# Patient Record
Sex: MALE | Race: BLACK OR AFRICAN AMERICAN | NOT HISPANIC OR LATINO | Employment: OTHER | URBAN - METROPOLITAN AREA
[De-identification: names, ages, dates, MRNs, and addresses within clinical notes are randomized per-mention and may not be internally consistent; named-entity substitution may affect disease eponyms.]

---

## 2024-09-26 ENCOUNTER — HOSPITAL ENCOUNTER (INPATIENT)
Facility: HOSPITAL | Age: 64
LOS: 3 days | Discharge: HOME HEALTH | DRG: 066 | End: 2024-09-30
Attending: EMERGENCY MEDICINE | Admitting: HOSPITALIST
Payer: MEDICARE

## 2024-09-26 DIAGNOSIS — D17.0 LIPOMA OF SKIN AND SUBCUTANEOUS TISSUE OF NECK: ICD-10-CM

## 2024-09-26 DIAGNOSIS — R07.9 CHEST PAIN: ICD-10-CM

## 2024-09-26 DIAGNOSIS — I63.9 CEREBROVASCULAR ACCIDENT (CVA), UNSPECIFIED MECHANISM: ICD-10-CM

## 2024-09-26 DIAGNOSIS — R53.1 GENERALIZED WEAKNESS: ICD-10-CM

## 2024-09-26 DIAGNOSIS — R42 DIZZINESS: Primary | ICD-10-CM

## 2024-09-26 DIAGNOSIS — I16.0 HYPERTENSIVE URGENCY: ICD-10-CM

## 2024-09-26 DIAGNOSIS — E07.9 THYROID LESION: ICD-10-CM

## 2024-09-26 LAB
BILIRUBIN, POC UA: NEGATIVE
BLOOD, POC UA: NEGATIVE
CLARITY, UA: CLEAR
COLOR, UA: YELLOW
GLUCOSE, POC UA: NEGATIVE
HCT, POC: NORMAL
HGB, POC: NORMAL (ref 14–18)
KETONES, POC UA: NEGATIVE
LEUKOCYTE EST, POC UA: NEGATIVE
MCH, POC: NORMAL
MCHC, POC: NORMAL
MCV, POC: NORMAL
MPV, POC: NORMAL
NITRITE, POC UA: NEGATIVE
PH UR STRIP: 6 [PH] (ref 5–8)
POC CARDIAC TROPONIN I: 0.02 NG/ML (ref 0–0.08)
POC PLATELET COUNT: NORMAL
POCT GLUCOSE: 91 MG/DL (ref 70–110)
PROTEIN, POC UA: NEGATIVE
RBC, POC: NORMAL
RDW, POC: NORMAL
SAMPLE: NORMAL
SPECIFIC GRAVITY, POC UA: 1.02 (ref 1–1.03)
UROBILINOGEN, POC UA: 0.2 E.U./DL
WBC, POC: NORMAL

## 2024-09-26 PROCEDURE — 93010 ELECTROCARDIOGRAM REPORT: CPT | Mod: ,,, | Performed by: INTERNAL MEDICINE

## 2024-09-26 PROCEDURE — 63600175 PHARM REV CODE 636 W HCPCS: Mod: ER | Performed by: EMERGENCY MEDICINE

## 2024-09-26 PROCEDURE — G0378 HOSPITAL OBSERVATION PER HR: HCPCS | Mod: ER

## 2024-09-26 PROCEDURE — 93005 ELECTROCARDIOGRAM TRACING: CPT | Mod: ER

## 2024-09-26 PROCEDURE — 25000003 PHARM REV CODE 250: Mod: ER | Performed by: EMERGENCY MEDICINE

## 2024-09-26 RX ORDER — ASPIRIN 325 MG
325 TABLET ORAL
Status: COMPLETED | OUTPATIENT
Start: 2024-09-26 | End: 2024-09-26

## 2024-09-26 RX ORDER — AMLODIPINE BESYLATE 5 MG/1
10 TABLET ORAL
Status: COMPLETED | OUTPATIENT
Start: 2024-09-26 | End: 2024-09-26

## 2024-09-26 RX ORDER — HYDRALAZINE HYDROCHLORIDE 20 MG/ML
10 INJECTION INTRAMUSCULAR; INTRAVENOUS
Status: COMPLETED | OUTPATIENT
Start: 2024-09-26 | End: 2024-09-26

## 2024-09-26 RX ORDER — CLONIDINE HYDROCHLORIDE 0.1 MG/1
0.2 TABLET ORAL
Status: COMPLETED | OUTPATIENT
Start: 2024-09-26 | End: 2024-09-26

## 2024-09-26 RX ORDER — MECLIZINE HYDROCHLORIDE 25 MG/1
25 TABLET ORAL
Status: COMPLETED | OUTPATIENT
Start: 2024-09-26 | End: 2024-09-26

## 2024-09-26 RX ADMIN — AMLODIPINE BESYLATE 10 MG: 5 TABLET ORAL at 10:09

## 2024-09-26 RX ADMIN — CLONIDINE HYDROCHLORIDE 0.2 MG: 0.1 TABLET ORAL at 10:09

## 2024-09-26 RX ADMIN — ASPIRIN 325 MG ORAL TABLET 325 MG: 325 PILL ORAL at 08:09

## 2024-09-26 RX ADMIN — HYDRALAZINE HYDROCHLORIDE 10 MG: 20 INJECTION INTRAMUSCULAR; INTRAVENOUS at 08:09

## 2024-09-26 RX ADMIN — MECLIZINE HYDROCHLORIDE 25 MG: 25 TABLET ORAL at 08:09

## 2024-09-27 PROBLEM — I16.0 HYPERTENSIVE URGENCY: Status: ACTIVE | Noted: 2024-09-27

## 2024-09-27 PROBLEM — R42 DIZZINESS: Status: ACTIVE | Noted: 2024-09-27

## 2024-09-27 PROBLEM — R79.89 ELEVATED TSH: Status: ACTIVE | Noted: 2024-09-27

## 2024-09-27 PROBLEM — E07.9 THYROID LESION: Status: ACTIVE | Noted: 2024-09-27

## 2024-09-27 PROBLEM — R29.90 STROKE-LIKE SYMPTOMS: Status: ACTIVE | Noted: 2024-09-27

## 2024-09-27 LAB
ALBUMIN SERPL BCP-MCNC: 4.1 G/DL (ref 3.5–5.2)
ALP SERPL-CCNC: 66 U/L (ref 55–135)
ALT SERPL W/O P-5'-P-CCNC: 13 U/L (ref 10–44)
ANION GAP SERPL CALC-SCNC: 10 MMOL/L (ref 8–16)
AST SERPL-CCNC: 22 U/L (ref 10–40)
BASOPHILS # BLD AUTO: 0.04 K/UL (ref 0–0.2)
BASOPHILS NFR BLD: 0.7 % (ref 0–1.9)
BILIRUB SERPL-MCNC: 1.2 MG/DL (ref 0.1–1)
BILIRUB UR QL STRIP: NEGATIVE
BUN SERPL-MCNC: 14 MG/DL (ref 8–23)
CALCIUM SERPL-MCNC: 9.2 MG/DL (ref 8.7–10.5)
CHLORIDE SERPL-SCNC: 106 MMOL/L (ref 95–110)
CHOLEST SERPL-MCNC: 143 MG/DL (ref 120–199)
CHOLEST/HDLC SERPL: 2.2 {RATIO} (ref 2–5)
CLARITY UR: CLEAR
CO2 SERPL-SCNC: 21 MMOL/L (ref 23–29)
COLOR UR: YELLOW
CREAT SERPL-MCNC: 1.1 MG/DL (ref 0.5–1.4)
DIFFERENTIAL METHOD BLD: ABNORMAL
EOSINOPHIL # BLD AUTO: 0.1 K/UL (ref 0–0.5)
EOSINOPHIL NFR BLD: 1.9 % (ref 0–8)
ERYTHROCYTE [DISTWIDTH] IN BLOOD BY AUTOMATED COUNT: 11.8 % (ref 11.5–14.5)
EST. GFR  (NO RACE VARIABLE): >60 ML/MIN/1.73 M^2
ESTIMATED AVG GLUCOSE: 77 MG/DL (ref 68–131)
GLUCOSE SERPL-MCNC: 85 MG/DL (ref 70–110)
GLUCOSE UR QL STRIP: NEGATIVE
HBA1C MFR BLD: 4.3 % (ref 4–5.6)
HCT VFR BLD AUTO: 43.5 % (ref 40–54)
HDLC SERPL-MCNC: 64 MG/DL (ref 40–75)
HDLC SERPL: 44.8 % (ref 20–50)
HGB BLD-MCNC: 14.3 G/DL (ref 14–18)
HGB UR QL STRIP: NEGATIVE
IMM GRANULOCYTES # BLD AUTO: 0.01 K/UL (ref 0–0.04)
IMM GRANULOCYTES NFR BLD AUTO: 0.2 % (ref 0–0.5)
INR PPP: 1 (ref 0.8–1.2)
KETONES UR QL STRIP: NEGATIVE
LDLC SERPL CALC-MCNC: 66 MG/DL (ref 63–159)
LEUKOCYTE ESTERASE UR QL STRIP: NEGATIVE
LYMPHOCYTES # BLD AUTO: 2.6 K/UL (ref 1–4.8)
LYMPHOCYTES NFR BLD: 45.2 % (ref 18–48)
MAGNESIUM SERPL-MCNC: 2.2 MG/DL (ref 1.6–2.6)
MCH RBC QN AUTO: 31.1 PG (ref 27–31)
MCHC RBC AUTO-ENTMCNC: 32.9 G/DL (ref 32–36)
MCV RBC AUTO: 95 FL (ref 82–98)
MONOCYTES # BLD AUTO: 0.5 K/UL (ref 0.3–1)
MONOCYTES NFR BLD: 8.2 % (ref 4–15)
NEUTROPHILS # BLD AUTO: 2.5 K/UL (ref 1.8–7.7)
NEUTROPHILS NFR BLD: 43.8 % (ref 38–73)
NITRITE UR QL STRIP: NEGATIVE
NONHDLC SERPL-MCNC: 79 MG/DL
NRBC BLD-RTO: 0 /100 WBC
OHS QRS DURATION: 78 MS
OHS QTC CALCULATION: 453 MS
PH UR STRIP: 6 [PH] (ref 5–8)
PHOSPHATE SERPL-MCNC: 3 MG/DL (ref 2.7–4.5)
PLATELET # BLD AUTO: 223 K/UL (ref 150–450)
PMV BLD AUTO: 10.5 FL (ref 9.2–12.9)
POTASSIUM SERPL-SCNC: 3.6 MMOL/L (ref 3.5–5.1)
PROT SERPL-MCNC: 7.1 G/DL (ref 6–8.4)
PROT UR QL STRIP: ABNORMAL
PROTHROMBIN TIME: 11 SEC (ref 9–12.5)
RBC # BLD AUTO: 4.6 M/UL (ref 4.6–6.2)
SODIUM SERPL-SCNC: 137 MMOL/L (ref 136–145)
SP GR UR STRIP: 1.02 (ref 1–1.03)
T4 FREE SERPL-MCNC: 0.98 NG/DL (ref 0.71–1.51)
TRIGL SERPL-MCNC: 65 MG/DL (ref 30–150)
TSH SERPL DL<=0.005 MIU/L-ACNC: 4.91 UIU/ML (ref 0.4–4)
URN SPEC COLLECT METH UR: ABNORMAL
UROBILINOGEN UR STRIP-ACNC: NEGATIVE EU/DL
WBC # BLD AUTO: 5.71 K/UL (ref 3.9–12.7)

## 2024-09-27 PROCEDURE — 25000003 PHARM REV CODE 250: Performed by: STUDENT IN AN ORGANIZED HEALTH CARE EDUCATION/TRAINING PROGRAM

## 2024-09-27 PROCEDURE — 25000003 PHARM REV CODE 250: Performed by: NURSE PRACTITIONER

## 2024-09-27 PROCEDURE — 81003 URINALYSIS AUTO W/O SCOPE: CPT | Performed by: NURSE PRACTITIONER

## 2024-09-27 PROCEDURE — 21400001 HC TELEMETRY ROOM

## 2024-09-27 PROCEDURE — 36415 COLL VENOUS BLD VENIPUNCTURE: CPT | Performed by: STUDENT IN AN ORGANIZED HEALTH CARE EDUCATION/TRAINING PROGRAM

## 2024-09-27 PROCEDURE — 85025 COMPLETE CBC W/AUTO DIFF WBC: CPT | Performed by: STUDENT IN AN ORGANIZED HEALTH CARE EDUCATION/TRAINING PROGRAM

## 2024-09-27 PROCEDURE — 80061 LIPID PANEL: CPT | Performed by: STUDENT IN AN ORGANIZED HEALTH CARE EDUCATION/TRAINING PROGRAM

## 2024-09-27 PROCEDURE — 80053 COMPREHEN METABOLIC PANEL: CPT | Performed by: STUDENT IN AN ORGANIZED HEALTH CARE EDUCATION/TRAINING PROGRAM

## 2024-09-27 PROCEDURE — 99223 1ST HOSP IP/OBS HIGH 75: CPT | Mod: ,,, | Performed by: INTERNAL MEDICINE

## 2024-09-27 PROCEDURE — 11000001 HC ACUTE MED/SURG PRIVATE ROOM

## 2024-09-27 PROCEDURE — 84100 ASSAY OF PHOSPHORUS: CPT | Performed by: STUDENT IN AN ORGANIZED HEALTH CARE EDUCATION/TRAINING PROGRAM

## 2024-09-27 PROCEDURE — 83036 HEMOGLOBIN GLYCOSYLATED A1C: CPT | Performed by: STUDENT IN AN ORGANIZED HEALTH CARE EDUCATION/TRAINING PROGRAM

## 2024-09-27 PROCEDURE — 63600175 PHARM REV CODE 636 W HCPCS: Performed by: STUDENT IN AN ORGANIZED HEALTH CARE EDUCATION/TRAINING PROGRAM

## 2024-09-27 PROCEDURE — 25500020 PHARM REV CODE 255: Performed by: HOSPITALIST

## 2024-09-27 PROCEDURE — 83735 ASSAY OF MAGNESIUM: CPT | Performed by: STUDENT IN AN ORGANIZED HEALTH CARE EDUCATION/TRAINING PROGRAM

## 2024-09-27 PROCEDURE — 85610 PROTHROMBIN TIME: CPT | Performed by: STUDENT IN AN ORGANIZED HEALTH CARE EDUCATION/TRAINING PROGRAM

## 2024-09-27 PROCEDURE — 84439 ASSAY OF FREE THYROXINE: CPT | Performed by: STUDENT IN AN ORGANIZED HEALTH CARE EDUCATION/TRAINING PROGRAM

## 2024-09-27 PROCEDURE — 84443 ASSAY THYROID STIM HORMONE: CPT | Performed by: STUDENT IN AN ORGANIZED HEALTH CARE EDUCATION/TRAINING PROGRAM

## 2024-09-27 RX ORDER — ACETAMINOPHEN 325 MG/1
650 TABLET ORAL EVERY 4 HOURS PRN
Status: DISCONTINUED | OUTPATIENT
Start: 2024-09-27 | End: 2024-09-27

## 2024-09-27 RX ORDER — ACETAMINOPHEN 325 MG/1
650 TABLET ORAL EVERY 8 HOURS PRN
Status: DISCONTINUED | OUTPATIENT
Start: 2024-09-27 | End: 2024-09-27

## 2024-09-27 RX ORDER — ONDANSETRON HYDROCHLORIDE 2 MG/ML
4 INJECTION, SOLUTION INTRAVENOUS EVERY 8 HOURS PRN
Status: DISCONTINUED | OUTPATIENT
Start: 2024-09-27 | End: 2024-09-30 | Stop reason: HOSPADM

## 2024-09-27 RX ORDER — PROCHLORPERAZINE EDISYLATE 5 MG/ML
5 INJECTION INTRAMUSCULAR; INTRAVENOUS EVERY 6 HOURS PRN
Status: DISCONTINUED | OUTPATIENT
Start: 2024-09-27 | End: 2024-09-30 | Stop reason: HOSPADM

## 2024-09-27 RX ORDER — SODIUM CHLORIDE 0.9 % (FLUSH) 0.9 %
10 SYRINGE (ML) INJECTION
Status: DISCONTINUED | OUTPATIENT
Start: 2024-09-27 | End: 2024-09-27

## 2024-09-27 RX ORDER — SODIUM,POTASSIUM PHOSPHATES 280-250MG
2 POWDER IN PACKET (EA) ORAL
Status: DISCONTINUED | OUTPATIENT
Start: 2024-09-27 | End: 2024-09-30 | Stop reason: HOSPADM

## 2024-09-27 RX ORDER — LABETALOL HYDROCHLORIDE 5 MG/ML
10 INJECTION, SOLUTION INTRAVENOUS
Status: DISCONTINUED | OUTPATIENT
Start: 2024-09-27 | End: 2024-09-28

## 2024-09-27 RX ORDER — POLYETHYLENE GLYCOL 3350 17 G/17G
17 POWDER, FOR SOLUTION ORAL DAILY
Status: DISCONTINUED | OUTPATIENT
Start: 2024-09-27 | End: 2024-09-30 | Stop reason: HOSPADM

## 2024-09-27 RX ORDER — ATORVASTATIN CALCIUM 40 MG/1
40 TABLET, FILM COATED ORAL DAILY
Status: DISCONTINUED | OUTPATIENT
Start: 2024-09-27 | End: 2024-09-30 | Stop reason: HOSPADM

## 2024-09-27 RX ORDER — BISACODYL 10 MG/1
10 SUPPOSITORY RECTAL DAILY PRN
Status: DISCONTINUED | OUTPATIENT
Start: 2024-09-27 | End: 2024-09-30 | Stop reason: HOSPADM

## 2024-09-27 RX ORDER — LANOLIN ALCOHOL/MO/W.PET/CERES
800 CREAM (GRAM) TOPICAL
Status: DISCONTINUED | OUTPATIENT
Start: 2024-09-27 | End: 2024-09-30 | Stop reason: HOSPADM

## 2024-09-27 RX ORDER — SODIUM CHLORIDE 0.9 % (FLUSH) 0.9 %
10 SYRINGE (ML) INJECTION EVERY 12 HOURS PRN
Status: DISCONTINUED | OUTPATIENT
Start: 2024-09-27 | End: 2024-09-30 | Stop reason: HOSPADM

## 2024-09-27 RX ORDER — NALOXONE HCL 0.4 MG/ML
0.02 VIAL (ML) INJECTION
Status: DISCONTINUED | OUTPATIENT
Start: 2024-09-27 | End: 2024-09-30 | Stop reason: HOSPADM

## 2024-09-27 RX ORDER — ENOXAPARIN SODIUM 100 MG/ML
40 INJECTION SUBCUTANEOUS EVERY 24 HOURS
Status: DISCONTINUED | OUTPATIENT
Start: 2024-09-27 | End: 2024-09-30 | Stop reason: HOSPADM

## 2024-09-27 RX ORDER — CLOPIDOGREL BISULFATE 75 MG/1
75 TABLET ORAL DAILY
Status: DISCONTINUED | OUTPATIENT
Start: 2024-09-28 | End: 2024-09-30 | Stop reason: HOSPADM

## 2024-09-27 RX ORDER — IBUPROFEN 200 MG
24 TABLET ORAL
Status: DISCONTINUED | OUTPATIENT
Start: 2024-09-27 | End: 2024-09-30 | Stop reason: HOSPADM

## 2024-09-27 RX ORDER — ASPIRIN 81 MG/1
81 TABLET ORAL DAILY
Status: DISCONTINUED | OUTPATIENT
Start: 2024-09-27 | End: 2024-09-30 | Stop reason: HOSPADM

## 2024-09-27 RX ORDER — IPRATROPIUM BROMIDE AND ALBUTEROL SULFATE 2.5; .5 MG/3ML; MG/3ML
3 SOLUTION RESPIRATORY (INHALATION) EVERY 4 HOURS PRN
Status: DISCONTINUED | OUTPATIENT
Start: 2024-09-27 | End: 2024-09-30 | Stop reason: HOSPADM

## 2024-09-27 RX ORDER — IBUPROFEN 200 MG
16 TABLET ORAL
Status: DISCONTINUED | OUTPATIENT
Start: 2024-09-27 | End: 2024-09-30 | Stop reason: HOSPADM

## 2024-09-27 RX ORDER — SIMETHICONE 80 MG
1 TABLET,CHEWABLE ORAL 4 TIMES DAILY PRN
Status: DISCONTINUED | OUTPATIENT
Start: 2024-09-27 | End: 2024-09-30 | Stop reason: HOSPADM

## 2024-09-27 RX ORDER — ALUMINUM HYDROXIDE, MAGNESIUM HYDROXIDE, AND SIMETHICONE 1200; 120; 1200 MG/30ML; MG/30ML; MG/30ML
30 SUSPENSION ORAL 4 TIMES DAILY PRN
Status: DISCONTINUED | OUTPATIENT
Start: 2024-09-27 | End: 2024-09-30 | Stop reason: HOSPADM

## 2024-09-27 RX ORDER — CLOPIDOGREL BISULFATE 300 MG/1
300 TABLET, FILM COATED ORAL ONCE
Status: COMPLETED | OUTPATIENT
Start: 2024-09-27 | End: 2024-09-27

## 2024-09-27 RX ORDER — ACETAMINOPHEN 325 MG/1
650 TABLET ORAL EVERY 6 HOURS PRN
Status: DISCONTINUED | OUTPATIENT
Start: 2024-09-27 | End: 2024-09-30 | Stop reason: HOSPADM

## 2024-09-27 RX ORDER — GLUCAGON 1 MG
1 KIT INJECTION
Status: DISCONTINUED | OUTPATIENT
Start: 2024-09-27 | End: 2024-09-30 | Stop reason: HOSPADM

## 2024-09-27 RX ORDER — TALC
6 POWDER (GRAM) TOPICAL NIGHTLY PRN
Status: DISCONTINUED | OUTPATIENT
Start: 2024-09-27 | End: 2024-09-30 | Stop reason: HOSPADM

## 2024-09-27 RX ADMIN — ENOXAPARIN SODIUM 40 MG: 40 INJECTION SUBCUTANEOUS at 05:09

## 2024-09-27 RX ADMIN — CLOPIDOGREL BISULFATE 300 MG: 300 TABLET, FILM COATED ORAL at 03:09

## 2024-09-27 RX ADMIN — ASPIRIN 81 MG: 81 TABLET, COATED ORAL at 03:09

## 2024-09-27 RX ADMIN — IOHEXOL 100 ML: 350 INJECTION, SOLUTION INTRAVENOUS at 01:09

## 2024-09-27 RX ADMIN — ATORVASTATIN CALCIUM 40 MG: 40 TABLET, FILM COATED ORAL at 03:09

## 2024-09-27 RX ADMIN — ACETAMINOPHEN 650 MG: 325 TABLET ORAL at 05:09

## 2024-09-27 NOTE — ASSESSMENT & PLAN NOTE
TSH 4.907 elevated, T4 wnl  Will repeat TSH in 6 weeks outpatient with PCP  Hold off on meds for now

## 2024-09-27 NOTE — ED NOTES
Second attempt made to call report to RN assuming care of pt; call was transferred, phone rang 8 times, and the call was disconnected.  Will make another attempt in 5 minutes

## 2024-09-27 NOTE — NURSING
[facility]  Nurses Note -- 4 Eyes      9/27/2024       Skin assessed on: Q Shift      [x] No Pressure Injuries Present    [x]Prevention Measures Documented    [] Yes LDA  for Pressure Injury Previously documented     [] Yes New Pressure Injury Discovered   [] LDA for New Pressure Injury Added      Attending RN:  Rakel Vidal RN     Second RN:  YARELI Griffin

## 2024-09-27 NOTE — ASSESSMENT & PLAN NOTE
Consulted neuro:  CTA head/neck Impression:CTA head: Unremarkable CTA of the head specifically without evidence for proximal significant stenosis or occlusion.CTA neck: No significant arterial stenosis throughout the neck..2.5 cm hypodense lesion left lobe of the thyroid follow-up nonemergent thyroid ultrasound advised.  CT head: No acute intracranial hemorrhage or sulcal effacement to suggest large territory recent infarction.  Patchy hypoattenuation supratentorial white matter while nonspecific suggestive for chronic ischemic change.  Subcentimeter hypodensity right insula suggestive for possible lacunar type infarct overall age indeterminate and may be remote.     Plan:   -echo pending   -MRI brain w/o contrast  - after loading with ASA 325mg and Plavix 300mg. Daily aspirin 81 mg /  clopidogrel 75 mg x 21 days followed by ASA 81mg OD monotherapy therafter  - started on Lipitor 40 mg daily  -Transthoracic ECHO (TTE). Holter monitor at d/c if TTE is negative   -Permissive HTN x 24 hrs post index event / long term < 140/90   -Goal Parameters for TIA/stroke: LDL<70 mg/dl, HbA1C: <7.0 %,  SBP<130/80 (discussed risk factor optimization in order to reduce the risk of future events with help of PCP)  -PT/OT evaluation and therapy goals per their recommendations  -Diet and Exercise: Discussed aggressive lifestyle modification. Eat primarily plant-based foods, such as fruits and vegetables, whole grains, legumes (beans) and nuts. Discussed Mediterranean diet. Daily exercise (150 minutes per week).  -Provided education regarding future stroke identification: I went over the usual stroke warning signs and symptoms, and the need to activate EMS (call 911) as soon as the symptoms present including-sudden onset numbness or weakness of the face, arm, or leg; especially on one side of the body; sudden confusion, trouble speaking, or understanding; sudden trouble seeing in one or both eyes; sudden trouble walking; dizziness; loss  of coordination.  - follow up in Neurology Clinic as outpatient

## 2024-09-27 NOTE — HPI
This is a 64-year-old male with no significant past medical history who presents with dizziness.      Patient presents for acute on chronic dizziness that started 4 months prior to presentation and worsened on the day of presentation.  Associated symptoms include bilateral lower extremity weakness, slurred speech and bilateral numbness.    In the ED, patient was hypertensive (170-210s/70-100s).  Labs were unremarkable.  CT head showed no acute abnormality.  Patient was given aspirin 325 mg, clonidine 0.2 mg p.o., hydralazine 10 mg IV, amlodipine 10 mg p.o., and meclizine 25 mg p.o..  He was transferred from [facility] ED to [facility] for upgrade of care.    On examination: patient daughter and patient reports: slurred speech, right hand writing off, headaches, leg weakness. His has a mild right facial droop, right sided arm drift  as well as ataxia, slurred speech currently. NIH scale of 4.  Patient being worked up for stroke.

## 2024-09-27 NOTE — ED NOTES
Call made to [facility] to ask about pt transport status; was told that [employer] transport company will not be able to transport pt for at least another 2 hours

## 2024-09-27 NOTE — CONSULTS
[facility] [place] - Telemetry  Neurology Consult Note    Patient Name: Radames Tarango  Age: 64 y.o.  MRN: [id number]  Admission Date: 9/26/2024  Reason for consult:  Dizziness    CC:  Dizziness    HPI:   Radames Tarango is a 64 y.o. year old male with no known past medical history presented to the [facility] ER with symptoms of dizziness and weakness in the legs.  History obtained from patient and chart review.    Patient states that he has been feeling slight headache and dizziness over the last few weeks along with pain in bilateral hips and resulting subjective weakness of the legs.  Was doing okay when he went to bed on 9/25.  Yesterday morning when he woke up, he felt sudden onset of dizziness and his legs gave out causing him to have a minor fall.  He also noted that his right arm dexterity was poor.  Daughter at the bedside has noticed that his speech is slurred as well.  Denies prior similar symptoms before this.  He is a current smoker, smokes 1 pack a day.  Drinks 1-2 beers everyday.  Does not take home aspirin.  Not under medical care and does not have known diagnosis of hypertension.    In the ED, /105, NSR on EKG.  Patient was not noted to have any neurological deficits and was out of window for acute therapy.  CT head obtained was unremarkable.  Patient was transferred to  inpatient for further evaluation.  Upon my evaluation this morning, patient noted to have a mild right-sided facial droop, right-sided arm drift as well as ataxia and slurred speech.  NIHSS 4.      Histories:  Allergies:  Patient has no known allergies.    Current Medications:    Current Facility-Administered Medications   Medication Dose Route Frequency Provider Last Rate Last Admin    acetaminophen tablet 650 mg  650 mg Oral Q6H PRN Juan Dotson NP        albuterol-ipratropium 2.5 mg-0.5 mg/3 mL nebulizer solution 3 mL  3 mL Nebulization Q4H PRN Samuel Heart MD        aluminum-magnesium hydroxide-simethicone 200-200-20 mg/5 mL  suspension 30 mL  30 mL Oral QID PRN Samuel Heart MD        aspirin EC tablet 81 mg  81 mg Oral Daily Juan Dotson NP        atorvastatin tablet 40 mg  40 mg Oral Daily Juan Dotson NP        bisacodyL suppository 10 mg  10 mg Rectal Daily PRN Samuel Heart MD        clopidogreL tablet 300 mg  300 mg Oral Once Juan Dotson NP        [START ON 9/28/2024] clopidogreL tablet 75 mg  75 mg Oral Daily Juan Dotson NP        dextrose 10% bolus 125 mL 125 mL  12.5 g Intravenous PRN Samuel Heart MD        dextrose 10% bolus 250 mL 250 mL  25 g Intravenous PRN Samuel Heart MD        enoxaparin injection 40 mg  40 mg Subcutaneous Daily Samuel Heart MD        glucagon (human recombinant) injection 1 mg  1 mg Intramuscular PRN Samuel Heart MD        glucose chewable tablet 16 g  16 g Oral PRN Samuel Heart MD        glucose chewable tablet 24 g  24 g Oral PRN Samuel Heart MD        labetaloL injection 10 mg  10 mg Intravenous Q15 Min PRN Juan Dotsno NP        magnesium oxide tablet 800 mg  800 mg Oral PRN Samuel Heart MD        magnesium oxide tablet 800 mg  800 mg Oral PRN Samuel Heart MD        melatonin tablet 6 mg  6 mg Oral Nightly PRN Samuel Heart MD        naloxone 0.4 mg/mL injection 0.02 mg  0.02 mg Intravenous PRN Samuel Heart MD        ondansetron injection 4 mg  4 mg Intravenous Q8H PRN Samuel Heart MD        polyethylene glycol packet 17 g  17 g Oral Daily Samuel Heart MD        potassium bicarbonate disintegrating tablet 35 mEq  35 mEq Oral PRN Samuel Heart MD        potassium bicarbonate disintegrating tablet 50 mEq  50 mEq Oral PRN Samuel Heart MD        potassium bicarbonate disintegrating tablet 60 mEq  60 mEq Oral PRN Samuel Heart MD        potassium, sodium phosphates 280-160-250 mg packet 2 packet  2 packet Oral PRN Samuel Heart MD        potassium, sodium phosphates 280-160-250 mg packet 2 packet  2 packet Oral PRN Samuel Heart MD        potassium,  "sodium phosphates 280-160-250 mg packet 2 packet  2 packet Oral PRN Samuel Heart MD        prochlorperazine injection Soln 5 mg  5 mg Intravenous Q6H PRN Samuel Heart MD        simethicone chewable tablet 80 mg  1 tablet Oral QID PRN Samuel Heart MD        sodium chloride 0.9% flush 10 mL  10 mL Intravenous Q12H PRN Samuel Heart MD           Medical: History reviewed. No pertinent past medical history.     Surgeries: History reviewed. No pertinent surgical history.     Family: No family history on file.,     Tobacco Use    Smoking status: Every Day     Current packs/day: 1.00     Types: Cigarettes    Smokeless tobacco: Never   Substance and Sexual Activity    Alcohol use: Yes     Alcohol/week: 2.0 standard drinks of alcohol     Types: 2 Cans of beer per week     Comment: 2 QUARTS DAILY    Drug use: Never    Sexual activity: Not on file         Physical Exam:     Physical Examination  BP (!) 196/89 (BP Location: Right arm, Patient Position: Lying)   Pulse 62   Temp 98.9 °F (37.2 °C) (Oral)   Resp 18   Ht 5' 7" (1.702 m)   Wt 92.9 kg (204 lb 12.9 oz)   SpO2 98%   BMI 32.08 kg/m²   Body mass index is 32.08 kg/m².    Neurological Exam  NIHSS (National [place] of [employer] Stroke Scale)   1a  Level of consciousness: 0=alert; keenly responsive   1b. LOC questions:  0 = Answers both questions correctly   1c. LOC commands: 0=Answers both tasks correctly   2.  Best Gaze: 0=normal   3. Visual: 0=No visual loss   4. Facial Palsy: 1=Minor paralysis (flattened nasolabial fold, asymmetric on smiling)   5a. Motor left arm: 0=No drift, limb holds 90 (or 45) degrees for full 10 seconds   5b.  Motor right arm: 1=Drift, limb holds 90 (or 45) degrees but drifts down before full 10 seconds: does not hit bed   6a. motor left le=No drift, limb holds 90 (or 45) degrees for full 10 seconds   6b  Motor right le=No drift, limb holds 90 (or 45) degrees for full 10 seconds   7. Limb Ataxia: 1=Present in one limb   8.  " Sensory: 0=Normal; no sensory loss   9. Best Language:  0=No aphasia, normal   10. Dysarthria: 1=Mild to moderate, patient slurs at least some words and at worst, can be understood with some difficulty   11. Extinction and Inattention: 0=No abnormality         Total:   4         Significant Labs:   Recent Lab Results         09/27/24  1333   09/27/24  0453   09/27/24  0452   09/26/24 2006        Albumin     4.1         ALP     66         ALT     13         Anion Gap     10         Appearance, UA Clear             AST     22         Baso #   0.04           Basophil %   0.7           Bilirubin (UA) Negative             BILIRUBIN TOTAL     1.2  Comment: For infants and newborns, interpretation of results should be based  on gestational age, weight and in agreement with clinical  observations.    Premature Infant recommended reference ranges:  Up to 24 hours.............<8.0 mg/dL  Up to 48 hours............<12.0 mg/dL  3-5 days..................<15.0 mg/dL  6-29 days.................<15.0 mg/dL           BUN     14         Calcium     9.2         Chloride     106         Cholesterol Total     143  Comment: The National Cholesterol Education Program (NCEP) has set the  following guidelines (reference ranges) for Cholesterol:  Optimal.....................<200 mg/dL  Borderline High.............200-239 mg/dL  High........................> or = 240 mg/dL           CO2     21         Color, UA Yellow             Creatinine     1.1         Differential Method   Automated           eGFR     >60         Eos #   0.1           Eos %   1.9           Estimated Avg Glucose     77         Free T4   0.98           Glucose     85         Glucose, UA Negative             Gran # (ANC)   2.5           Gran %   43.8           HDL     64  Comment: The National Cholesterol Education Program (NCEP) has set the  following guidelines (reference values) for HDL Cholesterol:  Low...............<40 mg/dL  Optimal...........>60 mg/dL            HDL/Cholesterol Ratio     44.8         Hematocrit   43.5           Hemoglobin   14.3           Hemoglobin A1C External     4.3  Comment: ADA Screening Guidelines:  5.7-6.4%  Consistent with prediabetes  >or=6.5%  Consistent with diabetes    High levels of fetal hemoglobin interfere with the HbA1C  assay. Heterozygous hemoglobin variants (HbS, HgC, etc)do  not significantly interfere with this assay.   However, presence of multiple variants may affect accuracy.           Immature Grans (Abs)   0.01  Comment: Mild elevation in immature granulocytes is non specific and   can be seen in a variety of conditions including stress response,   acute inflammation, trauma and pregnancy. Correlation with other   laboratory and clinical findings is essential.             Immature Granulocytes   0.2           INR   1.0  Comment: Coumadin Therapy:  2.0 - 3.0 for INR for all indicators except mechanical heart valves  and antiphospholipid syndromes which should use 2.5 - 3.5.             Ketones, UA Negative             LDL Cholesterol     66.0  Comment: The National Cholesterol Education Program (NCEP) has set the  following guidelines (reference values) for LDL Cholesterol:  Optimal.......................<130 mg/dL  Borderline High...............130-159 mg/dL  High..........................160-189 mg/dL  Very High.....................>190 mg/dL           Leukocyte Esterase, UA Negative             Lymph #   2.6           Lymph %   45.2           Magnesium      2.2         MCH   31.1           MCHC   32.9           MCV   95           Mono #   0.5           Mono %   8.2           MPV   10.5           NITRITE UA Negative             Non-HDL Cholesterol     79  Comment: Risk category and Non-HDL cholesterol goals:  Coronary heart disease (CHD)or equivalent (10-year risk of CHD >20%):  Non-HDL cholesterol goal     <130 mg/dL  Two or more CHD risk factors and 10-year risk of CHD <= 20%:  Non-HDL cholesterol goal     <160 mg/dL  0 to 1  CHD risk factor:  Non-HDL cholesterol goal     <190 mg/dL           nRBC   0           Blood, UA Negative             pH, UA 6.0             Phosphorus Level     3.0         Platelet Count, POC             Platelet Count   223           ISTAT Cardiac Troponin I       0.02       Potassium     3.6         PROTEIN TOTAL     7.1         Protein, UA Trace  Comment: Recommend a 24 hour urine protein or a urine   protein/creatinine ratio if globulin induced proteinuria is  clinically suspected.               PT   11.0           RBC   4.60           RDW   11.8           RDW-CV             Sample       unknown  Comment: A single negative troponin is insufficient to rule out myocardial infarction.  The use of a serial sampling protocol is recommended practice. Correlate results with reference intervals established for methodology used. Point of care and core laboratory   troponin results are not interchangeable.         Sodium     137         Spec Grav UA 1.025             Specimen UA Urine, Clean Catch             Total Cholesterol/HDL Ratio     2.2         Triglycerides     65  Comment: The National Cholesterol Education Program (NCEP) has set the  following guidelines (reference values) for triglycerides:  Normal......................<150 mg/dL  Borderline High.............150-199 mg/dL  High........................200-499 mg/dL           TSH   4.907           UROBILINOGEN UA Negative             WBC             WBC   5.71                   Significant Imaging:   Images were personally reviewed and interpreted:  CTA head and neck: no LVO/HGS    MRI brain without contrast:     Echo pending    Assessment and Plan:   64 y.o. year old male with no known past medical history presented to [facility] ER with symptoms of dizziness and weakness in the legs.  NIHSS 4 on my exam with mild dysarthria, right arm drift and ataxia, right-sided facial droop which improves with smiling.   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 Daily aspirin 81 mg /  clopidogrel 75 mg x 21 days followed by ASA 81mg OD monotherapy therafter  - started on Lipitor 40 mg daily  -Transthoracic ECHO (TTE). Holter monitor at d/c if TTE is negative   -Permissive HTN x 24 hrs post index event / long term < 140/90   -Goal Parameters for TIA/stroke: LDL<70 mg/dl, HbA1C: <7.0 %,  SBP<130/80 (discussed risk factor optimization in order to reduce the risk of future events with help of PCP)  -PT/OT evaluation and therapy goals per their recommendations  -Diet and Exercise: Discussed aggressive lifestyle modification. Eat primarily plant-based foods, such as fruits and vegetables, whole grains, legumes (beans) and nuts. Discussed Mediterranean diet. Daily exercise (150 minutes per week).  -Provided education regarding future stroke identification: I went over the usual stroke warning signs and symptoms, and the need to activate EMS (call 911) as soon as the symptoms present including-sudden onset numbness or weakness of the face, arm, or leg; especially on one side of the body; sudden confusion, trouble speaking, or understanding; sudden trouble seeing in one or both eyes; sudden trouble walking; dizziness; loss of coordination.  - discussed and counseled regarding smoking cessation and abstinence from alcohol.  - follow up in Neurology Clinic as outpatient      Thank you for your consult. I will sign off. Please contact us if you have any additional questions.    Time spent on this encounter: 30 minutes. This includes face to face time and non-face to face time preparing to see the patient (eg, review of tests), obtaining and/or reviewing separately obtained history, documenting clinical information in the electronic or other health record, independently interpreting results and communicating results to the patient/family/caregiver, or care coordinator.     Jamal Gaitan,  MD  Neurology  [facility]

## 2024-09-27 NOTE — PLAN OF CARE
09/27/24 1405   Discharge Planning   Assessment Type Discharge Planning Brief Assessment   Resource/Environmental Concerns none   Support Systems Children   Current Living Arrangements home   Patient/Family Anticipates Transition to home with family   Patient/Family Anticipated Services at Transition other (see comments)  (TBD)   DME Needed Upon Discharge  other (see comments)  (TBD)   Discharge Plan A Home with family   Discharge Plan B Home with family

## 2024-09-27 NOTE — H&P
[facility] Medicine  History & Physical    Patient Name: Radames Tarango  MRN: [id number]  Patient Class: OP- Observation  Admission Date: 9/26/2024  Attending Physician: Donald Live, *   Primary Care Provider: Rebeca, Primary Doctor         Patient information was obtained from patient, relative(s), and ER records.     Subjective:     Principal Problem:Stroke-like symptoms    Chief Complaint:   Chief Complaint   Patient presents with    Dizziness    Weakness     PT C/O BILAT HIP PAIN, BILAT SHOULDER PAIN, DIZZINESS FOR 1 WEEK  AND REPORTS LEGS FEEL WEAK SINCE THIS AM        HPI: This is a 64-year-old male with no significant past medical history who presents with dizziness.      Patient presents for acute on chronic dizziness that started 4 months prior to presentation and worsened on the day of presentation.  Associated symptoms include bilateral lower extremity weakness, slurred speech and bilateral numbness.    In the ED, patient was hypertensive (170-210s/70-100s).  Labs were unremarkable.  CT head showed no acute abnormality.  Patient was given aspirin 325 mg, clonidine 0.2 mg p.o., hydralazine 10 mg IV, amlodipine 10 mg p.o., and meclizine 25 mg p.o..  He was transferred from [facility] ED to [facility] for upgrade of care.    On examination: patient daughter and patient reports: slurred speech, right hand writing off, headaches, leg weakness. His has a mild right facial droop, right sided arm drift  as well as ataxia, slurred speech currently. NIH scale of 4.  Patient being worked up for stroke.                 History reviewed. No pertinent past medical history.    History reviewed. No pertinent surgical history.    Review of patient's allergies indicates:  No Known Allergies    No current facility-administered medications on file prior to encounter.     No current outpatient medications on file prior to encounter.     Family History    None       Tobacco Use    Smoking status:  Every Day     Current packs/day: 1.00     Types: Cigarettes    Smokeless tobacco: Never   Substance and Sexual Activity    Alcohol use: Yes     Alcohol/week: 2.0 standard drinks of alcohol     Types: 2 Cans of beer per week     Comment: 2 QUARTS DAILY    Drug use: Never    Sexual activity: Not on file     Review of Systems   Gastrointestinal:  Positive for nausea.   Genitourinary:  Positive for frequency.   Musculoskeletal:  Positive for arthralgias.   Neurological:  Positive for dizziness, facial asymmetry, speech difficulty, weakness, numbness and headaches.     Objective:     Vital Signs (Most Recent):  Temp: 98.9 °F (37.2 °C) (09/27/24 1118)  Pulse: 62 (09/27/24 1118)  Resp: 18 (09/27/24 1118)  BP: (!) 196/89 (09/27/24 1118)  SpO2: 98 % (09/27/24 1118) Vital Signs (24h Range):  Temp:  [97.4 °F (36.3 °C)-99.1 °F (37.3 °C)] 98.9 °F (37.2 °C)  Pulse:  [57-69] 62  Resp:  [13-22] 18  SpO2:  [97 %-100 %] 98 %  BP: (166-213)/() 196/89     Weight: 92.9 kg (204 lb 12.9 oz)  Body mass index is 32.08 kg/m².     Physical Exam  Constitutional:       General: He is not in acute distress.     Appearance: He is not ill-appearing.   HENT:      Head: Normocephalic and atraumatic.   Eyes:      Extraocular Movements: Extraocular movements intact.   Cardiovascular:      Rate and Rhythm: Normal rate and regular rhythm.   Pulmonary:      Effort: Pulmonary effort is normal.      Breath sounds: Normal breath sounds.   Abdominal:      General: Bowel sounds are normal.      Palpations: Abdomen is soft.   Musculoskeletal:         General: Normal range of motion.   Skin:     General: Skin is warm and dry.   Neurological:      Mental Status: He is alert and oriented to person, place, and time. Mental status is at baseline.      Comments: a mild right-sided facial droop, right-sided arm drift as well as ataxia and slurred speech.    Psychiatric:         Mood and Affect: Mood normal.         Behavior: Behavior normal.         Thought  Content: Thought content normal.         Judgment: Judgment normal.                Significant Labs: All pertinent labs within the past 24 hours have been reviewed.    Significant Imaging: I have reviewed all pertinent imaging results/findings within the past 24 hours.  Assessment/Plan:     * Stroke-like symptoms  Consulted neuro:  CTA head/neck Impression:CTA head: Unremarkable CTA of the head specifically without evidence for proximal significant stenosis or occlusion.CTA neck: No significant arterial stenosis throughout the neck..2.5 cm hypodense lesion left lobe of the thyroid follow-up nonemergent thyroid ultrasound advised.  CT head: No acute intracranial hemorrhage or sulcal effacement to suggest large territory recent infarction.  Patchy hypoattenuation supratentorial white matter while nonspecific suggestive for chronic ischemic change.  Subcentimeter hypodensity right insula suggestive for possible lacunar type infarct overall age indeterminate and may be remote.     Plan:   -echo pending   -MRI brain w/o contrast  - after loading with ASA 325mg and Plavix 300mg. Daily aspirin 81 mg /  clopidogrel 75 mg x 21 days followed by ASA 81mg OD monotherapy therafter  - started on Lipitor 40 mg daily  -Transthoracic ECHO (TTE). Holter monitor at d/c if TTE is negative   -Permissive HTN x 24 hrs post index event / long term < 140/90   -Goal Parameters for TIA/stroke: LDL<70 mg/dl, HbA1C: <7.0 %,  SBP<130/80 (discussed risk factor optimization in order to reduce the risk of future events with help of PCP)  -PT/OT evaluation and therapy goals per their recommendations  -Diet and Exercise: Discussed aggressive lifestyle modification. Eat primarily plant-based foods, such as fruits and vegetables, whole grains, legumes (beans) and nuts. Discussed Mediterranean diet. Daily exercise (150 minutes per week).  -Provided education regarding future stroke identification: I went over the usual stroke warning signs and symptoms,  and the need to activate EMS (call 911) as soon as the symptoms present including-sudden onset numbness or weakness of the face, arm, or leg; especially on one side of the body; sudden confusion, trouble speaking, or understanding; sudden trouble seeing in one or both eyes; sudden trouble walking; dizziness; loss of coordination.  - follow up in Neurology Clinic as outpatient      Hypertensive urgency  Currently on Permissive HTN if Systolic BP greater than 220 prn labetalol   Monitor BP     Thyroid lesion  Incidental finding on CT  Follow up outpatient with US  Follow up with PCP         Elevated TSH  TSH 4.907 elevated, T4 wnl  Will repeat TSH in 6 weeks outpatient with PCP  Hold off on meds for now         VTE Risk Mitigation (From admission, onward)           Ordered     enoxaparin injection 40 mg  Daily         09/27/24 0027     IP VTE HIGH RISK PATIENT  Once         09/27/24 0027     Place sequential compression device  Until discontinued         09/27/24 0027                         On 09/27/2024, patient should be placed in hospital observation services under my care in collaboration with Dr. Live.           Juan Dotson NP  Department of Hospital Medicine  [facility] - Telemetry

## 2024-09-27 NOTE — ED PROVIDER NOTES
Encounter Date: 9/26/2024    SCRIBE #1 NOTE: I, Keenan Mayo Do, am scribing for, and in the presence of,  Rito Minor MD. I have scribed the following portions of the note - Other sections scribed: HPI, ROS, PE.       History     Chief Complaint   Patient presents with    Dizziness    Weakness     PT C/O BILAT HIP PAIN, BILAT SHOULDER PAIN, DIZZINESS FOR 1 WEEK  AND REPORTS LEGS FEEL WEAK SINCE THIS AM     Edcarroll Tarango is a 64 y.o. male, with no known pertinent PMHx, who presents to the ED via daughter with acute-on-chronic dizziness for 4 months that was worse this morning. Patient reports associated bilateral lower extremity weakness, slurred speech, and bilateral side numbness that was worse to his right side, nausea, acute-on-chronic headaches, and urinary frequency. Patient notes episodes of dizziness and bilateral lower extremity weakness daily. Patient notes sleeping yesterday around 2200, with worsening symptoms after waking up. He notes previous attempted treatment with 2x aspirin and tylenol. He notes attempted treatment with tylenol and ibuprofen this morning. No other exacerbating or alleviating factors. Patient denies chest pain, cough, SOB, emesis, diarrhea, trouble urinating, dysuria, urinary pressure, or other associated symptoms. Patient endorses smoking 1 ppd. He notes drinking beer 1-2x per day. He denies previous PCP visits.     The history is provided by the patient. No  was used.     Review of patient's allergies indicates:  No Known Allergies  History reviewed. No pertinent past medical history.  History reviewed. No pertinent surgical history.  No family history on file.  Social History     Tobacco Use    Smoking status: Every Day     Current packs/day: 1.00     Types: Cigarettes    Smokeless tobacco: Never   Substance Use Topics    Alcohol use: Yes     Alcohol/week: 2.0 standard drinks of alcohol     Types: 2 Cans of beer per week     Comment: 2 QUARTS DAILY    Drug  use: Never     Review of Systems   Respiratory:  Negative for cough and shortness of breath.    Cardiovascular:  Negative for chest pain.   Gastrointestinal:  Positive for nausea. Negative for diarrhea and vomiting.   Genitourinary:  Positive for frequency. Negative for difficulty urinating and dysuria.   Neurological:  Positive for dizziness, speech difficulty, weakness, numbness and headaches.       Physical Exam     Initial Vitals [09/26/24 1941]   BP Pulse Resp Temp SpO2   (!) 213/105 67 20 98.7 °F (37.1 °C) 98 %      MAP       --         Physical Exam    Nursing note and vitals reviewed.  Constitutional: He appears well-developed. He is not diaphoretic. No distress.   HENT:   Head: Normocephalic.   Eyes: EOM are normal. Right eye exhibits no nystagmus. Left eye exhibits no nystagmus.   Cardiovascular:  Normal rate and regular rhythm.           No murmur heard.  Pulmonary/Chest: Effort normal and breath sounds normal. He has no wheezes.   Abdominal: Abdomen is soft. He exhibits no distension. There is no abdominal tenderness.   Musculoskeletal:         General: Normal range of motion.      Comments: No lower extremity edema bilaterally.      Neurological: He is alert.   Finger-nose-finger intact. Heel to shin intact bilaterally. No facial asymmetry. Negative pronator drift. Patient is oriented to time, place, and situation. No dysarthria. Sensations are normal.    Skin: Skin is warm.         ED Course   Critical Care    Date/Time: 9/26/2024 11:27 PM    Performed by: Rito Minor MD  Authorized by: Rito Minor MD  Direct patient critical care time: 15 minutes  Ordering / reviewing critical care time: 10 minutes  Documentation critical care time: 10 minutes  Total critical care time (exclusive of procedural time) : 35 minutes  Critical care time was exclusive of separately billable procedures and treating other patients and teaching time.  Critical care was necessary to treat or prevent imminent or  life-threatening deterioration of the following conditions: circulatory failure.  Critical care was time spent personally by me on the following activities: blood draw for specimens, development of treatment plan with patient or surrogate, obtaining history from patient or surrogate, ordering and performing treatments and interventions, ordering and review of laboratory studies, ordering and review of radiographic studies, pulse oximetry, re-evaluation of patient's condition, examination of patient and evaluation of patient's response to treatment.  Comments: Hypertension requiring IV and p.o. repeated doses of antihypertensives.        Labs Reviewed   TROPONIN ISTAT       Result Value    POC Cardiac Troponin I 0.02      Sample unknown     POCT CBC    Hematocrit        Hemoglobin        RBC        WBC        MCV        MCH, POC        MCHC        RDW-CV        Platelet Count, POC        MPV       POCT URINALYSIS W/O SCOPE   POCT URINALYSIS W/O SCOPE    Glucose, UA Negative      Bilirubin, UA Negative      Ketones, UA Negative      Spec Grav UA 1.020      Blood, UA Negative      PH, UA 6.0      Protein, UA Negative      Urobilinogen, UA 0.2      Nitrite, UA Negative      Leukocytes, UA Negative      Color, UA POC Yellow      Clarity, UA, POC Clear     POCT GLUCOSE, HAND-HELD DEVICE   POCT CMP   POCT TROPONIN   POCT GLUCOSE    POCT Glucose 91            Imaging Results              CT Head Without Contrast (Final result)  Result time 09/26/24 20:45:52      Final result by Halie Bailey MD (09/26/24 20:45:52)                   Impression:      No acute intracranial abnormality detected.    Right antrochoanal polyp.      Electronically signed by: Halie Bailey  Date:    09/26/2024  Time:    20:45               Narrative:    EXAMINATION:  CT OF THE HEAD WITHOUT    CLINICAL HISTORY:  Dizziness, persistent/recurrent, cardiac or vascular cause suspected;    TECHNIQUE:  5 mm unenhanced axial images were obtained  from the skull base to the vertex.    COMPARISON:  None.    FINDINGS:  Mild cerebral atrophy and moderate chronic small vessel ischemic changes are present.  There is no acute intracranial hemorrhage, territorial infarct or mass effect, or midline shift. The visualized paranasal sinuses, there is complete opacification the right maxillary sinus with extension into the right nasal cavity.  Findings may represent an antrochoanal polyp.  The sella is empty.                                       X-Ray Chest PA And Lateral (Final result)  Result time 09/26/24 20:34:04      Final result by Gigi Iniguez MD (09/26/24 20:34:04)                   Impression:      1. No acute cardiopulmonary process.      Electronically signed by: Gigi Iniguez MD  Date:    09/26/2024  Time:    20:34               Narrative:    EXAMINATION:  XR CHEST PA AND LATERAL    CLINICAL HISTORY:  Chest Pain;    TECHNIQUE:  PA and lateral views of the chest were performed.    COMPARISON:  None    FINDINGS:  The cardiomediastinal silhouette is not enlarged.  There is no pleural effusion.  The trachea is midline.  The lungs are symmetrically expanded bilaterally without evidence of acute parenchymal process. No large focal consolidation seen.  There is no pneumothorax.  The osseous structures are remarkable for degenerative change..                                       Medications   hydrALAZINE injection 10 mg (10 mg Intravenous Given 9/26/24 2057)   aspirin tablet 325 mg (325 mg Oral Given 9/26/24 2059)   meclizine tablet 25 mg (25 mg Oral Given 9/26/24 2059)   amLODIPine tablet 10 mg (10 mg Oral Given 9/26/24 2215)   cloNIDine tablet 0.2 mg (0.2 mg Oral Given 9/26/24 2225)     Medical Decision Making    64-year-old male who does not normally present to medical providers presenting today for recurrent generalized weakness and recurrent dizziness for the multiple weeks worse in the last 24 hours.  On exam the patient has no cranial nerve deficit.   No extremity deficit.  The patient appears to be answering appropriately.  Patient arrived hypertensive.  The patient did report paresthesias of the right arm right leg that had improved.  The patient also reported intermittent headaches but none at this time.  CT imaging revealed no ICH or mass.  No gross area of infarct.  Differential does include possible TIA/CVA.  The patient was provided antihypertensives.  Recommendation for observation for TIA.    Medical Decision Making:     A. Problem List:  1. Hypertension  2. Dizziness     B. Differential diagnosis:    TIA/CVA, peripheral vertigo, UTI, ACS    ECG:  Please check workup area for ECG interpretation.    Part of the note was done using electronic dictation services.           Amount and/or Complexity of Data Reviewed  Labs: ordered. Decision-making details documented in ED Course.  Radiology: ordered. Decision-making details documented in ED Course.  ECG/medicine tests: ordered and independent interpretation performed. Decision-making details documented in ED Course.    Risk  OTC drugs.  Prescription drug management.            Scribe Attestation:   Scribe #1: I performed the above scribed service and the documentation accurately describes the services I performed. I attest to the accuracy of the note.        ED Course as of 09/26/24 2327   Thu Sep 26, 2024   2025 EKG 12-lead  Time: 7:52 p.m.    Rate 70, sinus, regular rhythm, normal axis.   QRS 78 .  No ST elevation or depression no T-wave inversion no hyperacute T-waves.      Normal sinus rhythm with signs of LVH. [JM]   2042 POCT CBC  WBC 7.2 hemoglobin 14.8, hematocrit 43, platelet 247 [JM]   2115 POCT CMP  Sodium 140 potassium 4.4 CO2 27 chloride 107 glucose 90 calcium 10 BUN 16 creatinine 1.4 ALP 55 ALT 11 AST 35 total bili 0.7 albumin 3.8 total protein 7.2 [JM]      ED Course User Index  [JM] Rito Minor MD                             Clinical Impression:  Final diagnoses:  [R42]  Dizziness (Primary)  [R53.1] Generalized weakness       I, Rito Minor , personally performed the services described in this documentation. All medical record entries made by the scribe were at my direction and in my presence. I have reviewed the chart and agree that the record reflects my personal performance and is accurate and complete.      DISCLAIMER: This note was prepared with [employer] voice recognition transcription software. Garbled syntax, mangled pronouns, and other bizarre constructions may be attributed to that software system.     ED Disposition Condition    Observation Stable                Rito Minor MD  09/26/24 7500

## 2024-09-27 NOTE — PLAN OF CARE
Problem: Adult Inpatient Plan of Care  Goal: Absence of Hospital-Acquired Illness or Injury  Intervention: Identify and Manage Fall Risk  Flowsheets (Taken 9/27/2024 0536)  Safety Promotion/Fall Prevention:   assistive device/personal item within reach   bed alarm set   family to remain at bedside   medications reviewed   room near unit station   side rails raised x 2  Goal: Optimal Comfort and Wellbeing  Intervention: Monitor Pain and Promote Comfort  Flowsheets (Taken 9/27/2024 0536)  Pain Management Interventions:   care clustered   position adjusted   pillow support provided   quiet environment facilitated   warm blanket provided   medication offered

## 2024-09-27 NOTE — PT/OT/SLP PROGRESS
Physical Therapy      Patient Name:  Radames Tarango   MRN:  [id number]    Patient not seen today secondary to Testing/imaging (xray/CT/MRI). Will follow-up.

## 2024-09-27 NOTE — ED NOTES
EMS arrived in ED; report given to ems; paperwork given to ems included: emtala/transfer form, ed record, and facesheet

## 2024-09-27 NOTE — SUBJECTIVE & OBJECTIVE
History reviewed. No pertinent past medical history.    History reviewed. No pertinent surgical history.    Review of patient's allergies indicates:  No Known Allergies    No current facility-administered medications on file prior to encounter.     No current outpatient medications on file prior to encounter.     Family History    None       Tobacco Use    Smoking status: Every Day     Current packs/day: 1.00     Types: Cigarettes    Smokeless tobacco: Never   Substance and Sexual Activity    Alcohol use: Yes     Alcohol/week: 2.0 standard drinks of alcohol     Types: 2 Cans of beer per week     Comment: 2 QUARTS DAILY    Drug use: Never    Sexual activity: Not on file     Review of Systems   Gastrointestinal:  Positive for nausea.   Genitourinary:  Positive for frequency.   Musculoskeletal:  Positive for arthralgias.   Neurological:  Positive for dizziness, facial asymmetry, speech difficulty, weakness, numbness and headaches.     Objective:     Vital Signs (Most Recent):  Temp: 98.9 °F (37.2 °C) (09/27/24 1118)  Pulse: 62 (09/27/24 1118)  Resp: 18 (09/27/24 1118)  BP: (!) 196/89 (09/27/24 1118)  SpO2: 98 % (09/27/24 1118) Vital Signs (24h Range):  Temp:  [97.4 °F (36.3 °C)-99.1 °F (37.3 °C)] 98.9 °F (37.2 °C)  Pulse:  [57-69] 62  Resp:  [13-22] 18  SpO2:  [97 %-100 %] 98 %  BP: (166-213)/() 196/89     Weight: 92.9 kg (204 lb 12.9 oz)  Body mass index is 32.08 kg/m².     Physical Exam  Constitutional:       General: He is not in acute distress.     Appearance: He is not ill-appearing.   HENT:      Head: Normocephalic and atraumatic.   Eyes:      Extraocular Movements: Extraocular movements intact.   Cardiovascular:      Rate and Rhythm: Normal rate and regular rhythm.   Pulmonary:      Effort: Pulmonary effort is normal.      Breath sounds: Normal breath sounds.   Abdominal:      General: Bowel sounds are normal.      Palpations: Abdomen is soft.   Musculoskeletal:         General: Normal range of motion.    Skin:     General: Skin is warm and dry.   Neurological:      Mental Status: He is alert and oriented to person, place, and time. Mental status is at baseline.      Comments: a mild right-sided facial droop, right-sided arm drift as well as ataxia and slurred speech.    Psychiatric:         Mood and Affect: Mood normal.         Behavior: Behavior normal.         Thought Content: Thought content normal.         Judgment: Judgment normal.                Significant Labs: All pertinent labs within the past 24 hours have been reviewed.    Significant Imaging: I have reviewed all pertinent imaging results/findings within the past 24 hours.

## 2024-09-27 NOTE — ED NOTES
Report called to YARELI Griffin assuming care of pt, in sbar format with opportunity for questions

## 2024-09-27 NOTE — PT/OT/SLP PROGRESS
Occupational Therapy      Patient Name:  Radames Tarango   MRN:  [id number]    Patient not seen today secondary to Testing/imaging (xray/CT/MRI). Will follow-up later.    9/27/2024

## 2024-09-27 NOTE — ED NOTES
Attempted to call report to RN assuming care of pt; call was transferred, phone rang for a minute and then call was disconnected; will make a second attempt to call report in 10 minutes

## 2024-09-27 NOTE — NURSING
Received pt from ED to room via stretcher. Pt transported by [facility]. Transferred pt to bed. Evaluated patient condition. Admit assessment initiated. Telemetry monitoring initiated. Saline lock to IV flushed, intact. C/o of headache,3/10 pain scale,  PRN tylenol given. NAD noted.     [facility]  Nurses Note -- 4 Eyes      9/27/2024       Skin assessed on: Admit      [x] No Pressure Injuries Present    []Prevention Measures Documented    [] Yes LDA  for Pressure Injury Previously documented     [] Yes New Pressure Injury Discovered   [] LDA for New Pressure Injury Added      Attending RN:  Elias Moreno, RN     Second RN:  LucyRN

## 2024-09-28 PROBLEM — D14.0 INVERTED PAPILLOMA OF NASAL CAVITY: Status: ACTIVE | Noted: 2024-09-28

## 2024-09-28 PROBLEM — D17.0 LIPOMA OF SKIN AND SUBCUTANEOUS TISSUE OF NECK: Status: ACTIVE | Noted: 2024-09-28

## 2024-09-28 PROBLEM — R22.1 SUBCUTANEOUS NODULE OF NECK: Status: ACTIVE | Noted: 2024-09-28

## 2024-09-28 PROBLEM — I63.9 CVA (CEREBRAL VASCULAR ACCIDENT): Status: ACTIVE | Noted: 2024-09-27

## 2024-09-28 LAB
ALBUMIN SERPL BCP-MCNC: 4 G/DL (ref 3.5–5.2)
ALP SERPL-CCNC: 67 U/L (ref 55–135)
ALT SERPL W/O P-5'-P-CCNC: 13 U/L (ref 10–44)
ANION GAP SERPL CALC-SCNC: 8 MMOL/L (ref 8–16)
APTT PPP: 27.6 SEC (ref 21–32)
ASCENDING AORTA: 3.16 CM
AST SERPL-CCNC: 24 U/L (ref 10–40)
AV INDEX (PROSTH): 1.02
AV MEAN GRADIENT: 5 MMHG
AV PEAK GRADIENT: 9.5 MMHG
AV VALVE AREA BY VELOCITY RATIO: 2.7 CM²
AV VALVE AREA: 3.1 CM²
AV VELOCITY RATIO: 0.86
BASOPHILS # BLD AUTO: 0.03 K/UL (ref 0–0.2)
BASOPHILS NFR BLD: 0.5 % (ref 0–1.9)
BILIRUB SERPL-MCNC: 1.1 MG/DL (ref 0.1–1)
BSA FOR ECHO PROCEDURE: 2.1 M2
BUN SERPL-MCNC: 16 MG/DL (ref 8–23)
CALCIUM SERPL-MCNC: 9 MG/DL (ref 8.7–10.5)
CHLORIDE SERPL-SCNC: 106 MMOL/L (ref 95–110)
CO2 SERPL-SCNC: 24 MMOL/L (ref 23–29)
CREAT SERPL-MCNC: 1.3 MG/DL (ref 0.5–1.4)
CV ECHO LV RWT: 0.56 CM
DIFFERENTIAL METHOD BLD: ABNORMAL
DOP CALC AO PEAK VEL: 1.54 M/S
DOP CALC AO VTI: 28.2 CM
DOP CALC LVOT AREA: 3.1 CM2
DOP CALC LVOT DIAMETER: 1.98 CM
DOP CALC LVOT PEAK VEL: 1.33 M/S
DOP CALC LVOT STROKE VOLUME: 88.3 CM3
DOP CALC MV VTI: 30.6 CM
DOP CALCLVOT PEAK VEL VTI: 28.7 CM
E WAVE DECELERATION TIME: 222.94 MSEC
E/A RATIO: 0.66
E/E' RATIO: 17.11 M/S
ECHO LV POSTERIOR WALL: 1.38 CM (ref 0.6–1.1)
EOSINOPHIL # BLD AUTO: 0.1 K/UL (ref 0–0.5)
EOSINOPHIL NFR BLD: 1.1 % (ref 0–8)
ERYTHROCYTE [DISTWIDTH] IN BLOOD BY AUTOMATED COUNT: 11.7 % (ref 11.5–14.5)
EST. GFR  (NO RACE VARIABLE): >60 ML/MIN/1.73 M^2
FRACTIONAL SHORTENING: 42 % (ref 28–44)
GLUCOSE SERPL-MCNC: 106 MG/DL (ref 70–110)
HCT VFR BLD AUTO: 44.6 % (ref 40–54)
HGB BLD-MCNC: 14.9 G/DL (ref 14–18)
IMM GRANULOCYTES # BLD AUTO: 0.01 K/UL (ref 0–0.04)
IMM GRANULOCYTES NFR BLD AUTO: 0.2 % (ref 0–0.5)
INR PPP: 1 (ref 0.8–1.2)
INTERVENTRICULAR SEPTUM: 1.28 CM (ref 0.6–1.1)
IVC DIAMETER: 1.5 CM
IVRT: 64.7 MSEC
LA MAJOR: 5.44 CM
LA MINOR: 5.1 CM
LA WIDTH: 3.8 CM
LEFT ATRIUM SIZE: 4.38 CM
LEFT ATRIUM VOLUME INDEX: 36.5 ML/M2
LEFT ATRIUM VOLUME: 74.48 CM3
LEFT INTERNAL DIMENSION IN SYSTOLE: 2.86 CM (ref 2.1–4)
LEFT VENTRICLE DIASTOLIC VOLUME INDEX: 56.18 ML/M2
LEFT VENTRICLE DIASTOLIC VOLUME: 114.61 ML
LEFT VENTRICLE MASS INDEX: 129.8 G/M2
LEFT VENTRICLE SYSTOLIC VOLUME INDEX: 15.2 ML/M2
LEFT VENTRICLE SYSTOLIC VOLUME: 31.08 ML
LEFT VENTRICULAR INTERNAL DIMENSION IN DIASTOLE: 4.93 CM (ref 3.5–6)
LEFT VENTRICULAR MASS: 264.7 G
LV LATERAL E/E' RATIO: 15.4 M/S
LV SEPTAL E/E' RATIO: 19.25 M/S
LVED V (TEICH): 114.61 ML
LVES V (TEICH): 31.08 ML
LVOT MG: 3.33 MMHG
LVOT MV: 0.83 CM/S
LYMPHOCYTES # BLD AUTO: 2 K/UL (ref 1–4.8)
LYMPHOCYTES NFR BLD: 36.4 % (ref 18–48)
MAGNESIUM SERPL-MCNC: 2.3 MG/DL (ref 1.6–2.6)
MCH RBC QN AUTO: 31.6 PG (ref 27–31)
MCHC RBC AUTO-ENTMCNC: 33.4 G/DL (ref 32–36)
MCV RBC AUTO: 95 FL (ref 82–98)
MONOCYTES # BLD AUTO: 0.5 K/UL (ref 0.3–1)
MONOCYTES NFR BLD: 8.1 % (ref 4–15)
MV MEAN GRADIENT: 2 MMHG
MV PEAK A VEL: 1.16 M/S
MV PEAK E VEL: 0.77 M/S
MV PEAK GRADIENT: 6 MMHG
MV STENOSIS PRESSURE HALF TIME: 64.65 MS
MV VALVE AREA BY CONTINUITY EQUATION: 2.89 CM2
MV VALVE AREA P 1/2 METHOD: 3.4 CM2
NEUTROPHILS # BLD AUTO: 3 K/UL (ref 1.8–7.7)
NEUTROPHILS NFR BLD: 53.7 % (ref 38–73)
NRBC BLD-RTO: 0 /100 WBC
OHS CV RV/LV RATIO: 0.71 CM
PHOSPHATE SERPL-MCNC: 3.5 MG/DL (ref 2.7–4.5)
PISA TR MAX VEL: 2.37 M/S
PLATELET # BLD AUTO: 234 K/UL (ref 150–450)
PMV BLD AUTO: 9.5 FL (ref 9.2–12.9)
POTASSIUM SERPL-SCNC: 3.8 MMOL/L (ref 3.5–5.1)
PROT SERPL-MCNC: 7 G/DL (ref 6–8.4)
PROTHROMBIN TIME: 11 SEC (ref 9–12.5)
PV PEAK GRADIENT: 6 MMHG
PV PEAK VELOCITY: 1.26 M/S
RA MAJOR: 5.12 CM
RA PRESSURE ESTIMATED: 3 MMHG
RA WIDTH: 3.3 CM
RBC # BLD AUTO: 4.72 M/UL (ref 4.6–6.2)
RIGHT VENTRICLE DIASTOLIC BASEL DIMENSION: 3.5 CM
RIGHT VENTRICULAR END-DIASTOLIC DIMENSION: 3.53 CM
RV TB RVSP: 5 MMHG
RV TISSUE DOPPLER FREE WALL SYSTOLIC VELOCITY 1 (APICAL 4 CHAMBER VIEW): 20.01 CM/S
SINUS: 3.27 CM
SODIUM SERPL-SCNC: 138 MMOL/L (ref 136–145)
STJ: 2.13 CM
TDI LATERAL: 0.05 M/S
TDI SEPTAL: 0.04 M/S
TDI: 0.05 M/S
TR MAX PG: 22 MMHG
TRICUSPID ANNULAR PLANE SYSTOLIC EXCURSION: 2.77 CM
TV REST PULMONARY ARTERY PRESSURE: 25 MMHG
WBC # BLD AUTO: 5.55 K/UL (ref 3.9–12.7)
Z-SCORE OF LEFT VENTRICULAR DIMENSION IN END DIASTOLE: -2.1
Z-SCORE OF LEFT VENTRICULAR DIMENSION IN END SYSTOLE: -2.09

## 2024-09-28 PROCEDURE — 85025 COMPLETE CBC W/AUTO DIFF WBC: CPT | Performed by: NURSE PRACTITIONER

## 2024-09-28 PROCEDURE — 80053 COMPREHEN METABOLIC PANEL: CPT | Performed by: NURSE PRACTITIONER

## 2024-09-28 PROCEDURE — 94761 N-INVAS EAR/PLS OXIMETRY MLT: CPT

## 2024-09-28 PROCEDURE — 84100 ASSAY OF PHOSPHORUS: CPT | Performed by: NURSE PRACTITIONER

## 2024-09-28 PROCEDURE — 97116 GAIT TRAINING THERAPY: CPT

## 2024-09-28 PROCEDURE — 36415 COLL VENOUS BLD VENIPUNCTURE: CPT | Performed by: NURSE PRACTITIONER

## 2024-09-28 PROCEDURE — 63600175 PHARM REV CODE 636 W HCPCS: Performed by: NURSE PRACTITIONER

## 2024-09-28 PROCEDURE — 63600175 PHARM REV CODE 636 W HCPCS: Performed by: STUDENT IN AN ORGANIZED HEALTH CARE EDUCATION/TRAINING PROGRAM

## 2024-09-28 PROCEDURE — 97161 PT EVAL LOW COMPLEX 20 MIN: CPT

## 2024-09-28 PROCEDURE — 99900035 HC TECH TIME PER 15 MIN (STAT)

## 2024-09-28 PROCEDURE — 25000003 PHARM REV CODE 250: Performed by: STUDENT IN AN ORGANIZED HEALTH CARE EDUCATION/TRAINING PROGRAM

## 2024-09-28 PROCEDURE — 97530 THERAPEUTIC ACTIVITIES: CPT

## 2024-09-28 PROCEDURE — 11000001 HC ACUTE MED/SURG PRIVATE ROOM

## 2024-09-28 PROCEDURE — 83735 ASSAY OF MAGNESIUM: CPT | Performed by: NURSE PRACTITIONER

## 2024-09-28 PROCEDURE — 21400001 HC TELEMETRY ROOM

## 2024-09-28 PROCEDURE — 25000003 PHARM REV CODE 250: Performed by: NURSE PRACTITIONER

## 2024-09-28 PROCEDURE — 85730 THROMBOPLASTIN TIME PARTIAL: CPT | Performed by: NURSE PRACTITIONER

## 2024-09-28 PROCEDURE — 97165 OT EVAL LOW COMPLEX 30 MIN: CPT

## 2024-09-28 PROCEDURE — 85610 PROTHROMBIN TIME: CPT | Performed by: NURSE PRACTITIONER

## 2024-09-28 RX ORDER — CLONIDINE HYDROCHLORIDE 0.1 MG/1
0.2 TABLET ORAL EVERY 6 HOURS PRN
Status: DISCONTINUED | OUTPATIENT
Start: 2024-09-28 | End: 2024-09-28

## 2024-09-28 RX ORDER — HYDRALAZINE HYDROCHLORIDE 20 MG/ML
10 INJECTION INTRAMUSCULAR; INTRAVENOUS EVERY 6 HOURS PRN
Status: DISCONTINUED | OUTPATIENT
Start: 2024-09-28 | End: 2024-09-29

## 2024-09-28 RX ORDER — HYDRALAZINE HYDROCHLORIDE 25 MG/1
100 TABLET, FILM COATED ORAL EVERY 8 HOURS
Status: DISCONTINUED | OUTPATIENT
Start: 2024-09-28 | End: 2024-09-30 | Stop reason: HOSPADM

## 2024-09-28 RX ORDER — HYDRALAZINE HYDROCHLORIDE 25 MG/1
50 TABLET, FILM COATED ORAL EVERY 8 HOURS
Status: DISCONTINUED | OUTPATIENT
Start: 2024-09-28 | End: 2024-09-28

## 2024-09-28 RX ORDER — NITROGLYCERIN 0.4 MG/1
0.4 TABLET SUBLINGUAL EVERY 5 MIN PRN
Status: DISCONTINUED | OUTPATIENT
Start: 2024-09-28 | End: 2024-09-30 | Stop reason: HOSPADM

## 2024-09-28 RX ORDER — LOSARTAN POTASSIUM 25 MG/1
25 TABLET ORAL DAILY
Status: DISCONTINUED | OUTPATIENT
Start: 2024-09-28 | End: 2024-09-28

## 2024-09-28 RX ORDER — NIFEDIPINE 30 MG/1
30 TABLET, EXTENDED RELEASE ORAL DAILY
Status: DISCONTINUED | OUTPATIENT
Start: 2024-09-29 | End: 2024-09-29

## 2024-09-28 RX ORDER — AMLODIPINE BESYLATE 5 MG/1
10 TABLET ORAL DAILY
Status: DISCONTINUED | OUTPATIENT
Start: 2024-09-28 | End: 2024-09-28

## 2024-09-28 RX ORDER — LOSARTAN POTASSIUM 25 MG/1
75 TABLET ORAL ONCE
Status: COMPLETED | OUTPATIENT
Start: 2024-09-28 | End: 2024-09-28

## 2024-09-28 RX ORDER — LABETALOL HYDROCHLORIDE 5 MG/ML
10 INJECTION, SOLUTION INTRAVENOUS EVERY 6 HOURS PRN
Status: DISCONTINUED | OUTPATIENT
Start: 2024-09-28 | End: 2024-09-29

## 2024-09-28 RX ORDER — LOSARTAN POTASSIUM 25 MG/1
75 TABLET ORAL ONCE
Status: DISCONTINUED | OUTPATIENT
Start: 2024-09-28 | End: 2024-09-28

## 2024-09-28 RX ORDER — LOSARTAN POTASSIUM 25 MG/1
100 TABLET ORAL DAILY
Status: DISCONTINUED | OUTPATIENT
Start: 2024-09-29 | End: 2024-09-29

## 2024-09-28 RX ADMIN — ENOXAPARIN SODIUM 40 MG: 40 INJECTION SUBCUTANEOUS at 04:09

## 2024-09-28 RX ADMIN — CLONIDINE HYDROCHLORIDE 0.2 MG: 0.1 TABLET ORAL at 05:09

## 2024-09-28 RX ADMIN — AMLODIPINE BESYLATE 10 MG: 5 TABLET ORAL at 09:09

## 2024-09-28 RX ADMIN — ATORVASTATIN CALCIUM 40 MG: 40 TABLET, FILM COATED ORAL at 07:09

## 2024-09-28 RX ADMIN — LOSARTAN POTASSIUM 75 MG: 25 TABLET, FILM COATED ORAL at 06:09

## 2024-09-28 RX ADMIN — CLOPIDOGREL BISULFATE 75 MG: 75 TABLET ORAL at 07:09

## 2024-09-28 RX ADMIN — HYDRALAZINE HYDROCHLORIDE 100 MG: 25 TABLET ORAL at 09:09

## 2024-09-28 RX ADMIN — LOSARTAN POTASSIUM 25 MG: 25 TABLET, FILM COATED ORAL at 12:09

## 2024-09-28 RX ADMIN — LABETALOL HYDROCHLORIDE 10 MG: 5 INJECTION INTRAVENOUS at 05:09

## 2024-09-28 RX ADMIN — Medication 6 MG: at 09:09

## 2024-09-28 RX ADMIN — ACETAMINOPHEN 650 MG: 325 TABLET ORAL at 09:09

## 2024-09-28 RX ADMIN — HYDRALAZINE HYDROCHLORIDE 50 MG: 25 TABLET ORAL at 12:09

## 2024-09-28 RX ADMIN — ASPIRIN 81 MG: 81 TABLET, COATED ORAL at 07:09

## 2024-09-28 NOTE — ASSESSMENT & PLAN NOTE
MRI brain showed Heterogeneous signal material fills the right maxillary antra extending to the nasal cavity raising concern for possible inverted papilloma versus polyp  Clinical correlation and follow-up nonemergent ENT evaluation recommended.

## 2024-09-28 NOTE — NURSING
Note that Telemedicine General Neurology consult called to [phone]. Left messages x 2.    Will continue to attempt contact.

## 2024-09-28 NOTE — SUBJECTIVE & OBJECTIVE
Interval History: patient seen and examined. MRI showed Stroke. Reconsult tele neuro for confirmed stroke. Start on Norvasc.     Review of Systems   Gastrointestinal:  Positive for nausea.   Genitourinary:  Positive for frequency.   Musculoskeletal:  Positive for arthralgias.   Neurological:  Positive for dizziness, facial asymmetry, speech difficulty, weakness, numbness and headaches.     Objective:     Vital Signs (Most Recent):  Temp: 98.8 °F (37.1 °C) (09/28/24 0817)  Pulse: 60 (09/28/24 0817)  Resp: 20 (09/28/24 0817)  BP: (!) 218/103 (09/28/24 0817)  SpO2: 99 % (09/28/24 0817) Vital Signs (24h Range):  Temp:  [98.1 °F (36.7 °C)-99.5 °F (37.5 °C)] 98.8 °F (37.1 °C)  Pulse:  [58-69] 60  Resp:  [18-20] 20  SpO2:  [96 %-100 %] 99 %  BP: (178-240)/() 218/103     Weight: 92.9 kg (204 lb 12.9 oz)  Body mass index is 32.08 kg/m².    Intake/Output Summary (Last 24 hours) at 9/28/2024 0933  Last data filed at 9/28/2024 0600  Gross per 24 hour   Intake 960 ml   Output 400 ml   Net 560 ml         Physical Exam  Constitutional:       General: He is not in acute distress.     Appearance: He is not ill-appearing.   HENT:      Head: Normocephalic and atraumatic.   Eyes:      Extraocular Movements: Extraocular movements intact.   Neck:      Comments: Lipoma on back of neck   Cardiovascular:      Rate and Rhythm: Normal rate and regular rhythm.   Pulmonary:      Effort: Pulmonary effort is normal.      Breath sounds: Normal breath sounds.   Abdominal:      General: Bowel sounds are normal.      Palpations: Abdomen is soft.   Musculoskeletal:         General: Normal range of motion.   Skin:     General: Skin is warm and dry.   Neurological:      Mental Status: He is alert and oriented to person, place, and time. Mental status is at baseline.      Comments: a mild right-sided facial droop, right-sided arm drift as well as ataxia and slurred speech.    Psychiatric:         Mood and Affect: Mood normal.         Behavior:  Behavior normal.         Thought Content: Thought content normal.         Judgment: Judgment normal.             Significant Labs: All pertinent labs within the past 24 hours have been reviewed.    Significant Imaging: I have reviewed all pertinent imaging results/findings within the past 24 hours.

## 2024-09-28 NOTE — PLAN OF CARE
Problem: Adult Inpatient Plan of Care  Goal: Patient-Specific Goal (Individualized)  Outcome: Progressing  Goal: Absence of Hospital-Acquired Illness or Injury  Outcome: Progressing  Goal: Optimal Comfort and Wellbeing  Outcome: Progressing  Goal: Readiness for Transition of Care  Outcome: Progressing     Problem: Skin Injury Risk Increased  Goal: Skin Health and Integrity  Outcome: Progressing     Problem: Infection  Goal: Absence of Infection Signs and Symptoms  Outcome: Progressing     Problem: Stroke, Ischemic (Includes Transient Ischemic Attack)  Goal: Optimal Coping  Outcome: Progressing  Goal: Effective Bowel Elimination  Outcome: Progressing  Goal: Optimal Cerebral Tissue Perfusion  Outcome: Progressing  Goal: Optimal Cognitive Function  Outcome: Progressing  Goal: Improved Communication Skills  Outcome: Progressing  Goal: Optimal Functional Ability  Outcome: Progressing  Goal: Optimal Nutrition Intake  Outcome: Progressing  Goal: Effective Oxygenation and Ventilation  Outcome: Progressing  Goal: Improved Sensorimotor Function  Outcome: Progressing  Goal: Safe and Effective Swallow  Outcome: Progressing  Goal: Effective Urinary Elimination  Outcome: Progressing

## 2024-09-28 NOTE — PLAN OF CARE
Problem: Infection  Goal: Absence of Infection Signs and Symptoms  Outcome: Met  Intervention: Prevent or Manage Infection  Flowsheets (Taken 9/28/2024 1438)  Fever Reduction/Comfort Measures:   lightweight bedding   lightweight clothing     Problem: Stroke, Ischemic (Includes Transient Ischemic Attack)  Goal: Optimal Coping  Outcome: Met     Problem: Stroke, Ischemic (Includes Transient Ischemic Attack)  Goal: Optimal Cerebral Tissue Perfusion  Intervention: Protect and Optimize Cerebral Perfusion  Flowsheets (Taken 9/28/2024 1438)  Sensory Stimulation Regulation:   auditory stimulation minimized   care clustered   quiet environment promoted  Cerebral Perfusion Promotion:   blood pressure monitored   normothermia promoted

## 2024-09-28 NOTE — NURSING
[facility] MEWS TRIGGER FOLLOW UP       MEWS Monitoring, Score is: 3  Indication for review: /98    Bedside Nurse, Alejandra contacted, patient is on permissive HTN. SBP permitted up to 220.

## 2024-09-28 NOTE — PROGRESS NOTES
[facility]  Progress Note    Patient Name: Radames Tarango  MRN: [id number]  Patient Class: IP- Inpatient   Admission Date: 9/26/2024  Length of Stay: 1 days  Attending Physician: Donald Live, *  Primary Care Provider: Rebeca, Primary Doctor        Subjective:     Principal Problem:CVA (cerebral vascular accident)        HPI:  This is a 64-year-old male with no significant past medical history who presents with dizziness.      Patient presents for acute on chronic dizziness that started 4 months prior to presentation and worsened on the day of presentation.  Associated symptoms include bilateral lower extremity weakness, slurred speech and bilateral numbness.    In the ED, patient was hypertensive (170-210s/70-100s).  Labs were unremarkable.  CT head showed no acute abnormality.  Patient was given aspirin 325 mg, clonidine 0.2 mg p.o., hydralazine 10 mg IV, amlodipine 10 mg p.o., and meclizine 25 mg p.o..  He was transferred from [facility] ED to [facility] for upgrade of care.    On examination: patient daughter and patient reports: slurred speech, right hand writing off, headaches, leg weakness. His has a mild right facial droop, right sided arm drift  as well as ataxia, slurred speech currently. NIH scale of 4.  Patient being worked up for stroke.                 Overview/Hospital Course:  No notes on file    Interval History: patient seen and examined. MRI showed Stroke. Reconsult tele neuro for confirmed stroke. Start on Norvasc. BP systolic still up in the 200's added losartan 25 mg and hydralazine 50 mg every 8 hours. BP improved systolic in 190's. Will continue to monitor BP     Review of Systems   Gastrointestinal:  Positive for nausea.   Genitourinary:  Positive for frequency.   Musculoskeletal:  Positive for arthralgias.   Neurological:  Positive for dizziness, facial asymmetry, speech difficulty, weakness, numbness and headaches.     Objective:     Vital Signs (Most  Recent):  Temp: 98.8 °F (37.1 °C) (09/28/24 0817)  Pulse: 60 (09/28/24 0817)  Resp: 20 (09/28/24 0817)  BP: (!) 218/103 (09/28/24 0817)  SpO2: 99 % (09/28/24 0817) Vital Signs (24h Range):  Temp:  [98.1 °F (36.7 °C)-99.5 °F (37.5 °C)] 98.8 °F (37.1 °C)  Pulse:  [58-69] 60  Resp:  [18-20] 20  SpO2:  [96 %-100 %] 99 %  BP: (178-240)/() 218/103     Weight: 92.9 kg (204 lb 12.9 oz)  Body mass index is 32.08 kg/m².    Intake/Output Summary (Last 24 hours) at 9/28/2024 0933  Last data filed at 9/28/2024 0600  Gross per 24 hour   Intake 960 ml   Output 400 ml   Net 560 ml         Physical Exam  Constitutional:       General: He is not in acute distress.     Appearance: He is not ill-appearing.   HENT:      Head: Normocephalic and atraumatic.   Eyes:      Extraocular Movements: Extraocular movements intact.   Neck:      Comments: Lipoma on back of neck   Cardiovascular:      Rate and Rhythm: Normal rate and regular rhythm.   Pulmonary:      Effort: Pulmonary effort is normal.      Breath sounds: Normal breath sounds.   Abdominal:      General: Bowel sounds are normal.      Palpations: Abdomen is soft.   Musculoskeletal:         General: Normal range of motion.   Skin:     General: Skin is warm and dry.   Neurological:      Mental Status: He is alert and oriented to person, place, and time. Mental status is at baseline.      Comments: a mild right-sided facial droop, right-sided arm drift as well as ataxia and slurred speech.    Psychiatric:         Mood and Affect: Mood normal.         Behavior: Behavior normal.         Thought Content: Thought content normal.         Judgment: Judgment normal.             Significant Labs: All pertinent labs within the past 24 hours have been reviewed.    Significant Imaging: I have reviewed all pertinent imaging results/findings within the past 24 hours.    Assessment/Plan:      * CVA (cerebral vascular accident)  Consulted neuro:  CTA head/neck Impression:CTA head: Unremarkable CTA  of the head specifically without evidence for proximal significant stenosis or occlusion.CTA neck: No significant arterial stenosis throughout the neck..2.5 cm hypodense lesion left lobe of the thyroid follow-up nonemergent thyroid ultrasound advised.  CT head: No acute intracranial hemorrhage or sulcal effacement to suggest large territory recent infarction.  Patchy hypoattenuation supratentorial white matter while nonspecific suggestive for chronic ischemic change.  Subcentimeter hypodensity right insula suggestive for possible lacunar type infarct overall age indeterminate and may be remote.  MRI brain: Small bandlike region of acute infarction left corona radiata extending to the posterior external capsule.  No significant mass effect or hemorrhagic conversionSuperimposed remote right subinsular lacunar type infarcts and patchy and confluent T2 FLAIR signal hyperintensity elsewhere supratentorial white matter while nonspecific concerning for underlying chronic ischemic change      Plan:     -echo:    Left Ventricle: The left ventricle is normal in size. There is mild concentric hypertrophy. There is normal systolic function with a visually estimated ejection fraction of 60 - 65%. Grade I diastolic dysfunction.    Right Ventricle: Normal right ventricular cavity size. Systolic function is normal.    Left Atrium: Left atrium is mildly dilated.    Pulmonary Artery: The estimated pulmonary artery systolic pressure is 25 mmHg.    IVC/SVC: Normal venous pressure at 3 mmHg.     - after loading with ASA 325mg and Plavix 300mg. Daily aspirin 81 mg /  clopidogrel 75 mg x 21 days followed by ASA 81mg OD monotherapy therafter  - started on Lipitor 40 mg daily  -Transthoracic ECHO (TTE). Holter monitor at d/c if TTE is negative   -Permissive HTN x 24 hrs post index event / long term < 140/90   -Goal Parameters for TIA/stroke: LDL<70 mg/dl, HbA1C: <7.0 %,  SBP<130/80 (discussed risk factor optimization in order to reduce the  risk of future events with help of PCP)  -PT/OT evaluation and therapy goals per their recommendations  -Diet and Exercise: Discussed aggressive lifestyle modification. Eat primarily plant-based foods, such as fruits and vegetables, whole grains, legumes (beans) and nuts. Discussed Mediterranean diet. Daily exercise (150 minutes per week).  -Provided education regarding future stroke identification: I went over the usual stroke warning signs and symptoms, and the need to activate EMS (call 911) as soon as the symptoms present including-sudden onset numbness or weakness of the face, arm, or leg; especially on one side of the body; sudden confusion, trouble speaking, or understanding; sudden trouble seeing in one or both eyes; sudden trouble walking; dizziness; loss of coordination.  - follow up in Neurology Clinic as outpatient      Lipoma of skin and subcutaneous tissue of neck  Referral general surgeon on discharge       Inverted papilloma of nasal cavity  MRI brain showed Heterogeneous signal material fills the right maxillary antra extending to the nasal cavity raising concern for possible inverted papilloma versus polyp  Clinical correlation and follow-up nonemergent ENT evaluation recommended.      Hypertensive urgency  Start on Norvasc 10 mg daily, losartan 25 mg daily, hydralazine 50 mg every 8 hours   Monitor BP   Hydralazine and clonidine as needed     Thyroid lesion  Incidental finding on CT  Follow up outpatient with US  Follow up with PCP         Elevated TSH  TSH 4.907 elevated, T4 wnl  Will repeat TSH in 6 weeks outpatient with PCP  Hold off on meds for now         VTE Risk Mitigation (From admission, onward)           Ordered     enoxaparin injection 40 mg  Daily         09/27/24 0027     IP VTE HIGH RISK PATIENT  Once         09/27/24 0027     Place sequential compression device  Until discontinued         09/27/24 0027                    Discharge Planning   JOVON:      Code Status: Full Code   Is the  patient medically ready for discharge?:     Reason for patient still in hospital (select all that apply): Patient trending condition, Consult recommendations, and PT / OT recommendations  Discharge Plan A: Home with family                  Juan Dotson NP  Department of [facility]   [facility]

## 2024-09-28 NOTE — CARE UPDATE
09/28/24 0815   Patient Assessment/Suction   Level of Consciousness (AVPU) alert   All Lung Fields Breath Sounds clear   PRE-TX-O2   Device (Oxygen Therapy) room air   SpO2 99 %   Pulse Oximetry Type Intermittent   $ Pulse Oximetry - Multiple Charge Pulse Oximetry - Multiple   Pulse 67   Aerosol Therapy   $ Aerosol Therapy Charges PRN treatment not required   Respiratory Treatment Status (SVN) PRN treatment not required

## 2024-09-28 NOTE — PLAN OF CARE
Problem: Occupational Therapy  Goal: Occupational Therapy Goal  Description: Goals to be met by: 10/12/24     Patient will increase functional independence with ADLs by performing:    Feeding with [place].  LE Dressing with [place].  Grooming while standing at sink with [place].  Supine to sit with [place].  Step transfer with [place]  Toilet transfer to toilet with [place].  Upper extremity exercise program (thera-putty therex) x 10-15  reps per handout, with independence.    Outcome: Progressing

## 2024-09-28 NOTE — PLAN OF CARE
Problem: Physical Therapy  Goal: Physical Therapy Goal  Description: Goals to be met by: 10/05/2024     Patient will increase functional independence with mobility by performin. Supine to sit with Modified [place]  2. Sit to supine with Modified [place]  3. Sit to stand transfer with Modified [place]  4. Gait  x 100 feet with Modified [place] using No Assistive Device.   5. Lower extremity exercise program x30 reps per handout, with independence    Outcome: Progressing     Pt would benefit from low intensity w/ caregiver assistant at current time.

## 2024-09-28 NOTE — PT/OT/SLP EVAL
Physical Therapy Evaluation    Patient Name:  Radames Tarango   MRN:  [id number]    Recommendations:     Discharge Recommendations: Low Intensity Therapy   Discharge Equipment Recommendations: none   Barriers to discharge: None    Assessment:     Radames Tarango is a 64 y.o. male admitted with a medical diagnosis of CVA (cerebral vascular accident).  He presents with the following impairments/functional limitations: weakness, impaired endurance, impaired self care skills, impaired functional mobility, gait instability, impaired balance, decreased safety awareness. Pt's BP was 229/105 in supine and 228/102 in seated position.Pt's 2 daughter were presented in the room. Pt was able to perform all tranfers and ambulation without increase symptoms. He did feels dizziness in supine, seated and ambulation. Pt was able to perform supine<>sit with SBAx1 and Sit<>stand: CGAx1 no AD. Pt able to ambulated 26 ft without any lost of balance or any increase in symptoms.      Rehab Prognosis: Good; patient would benefit from acute skilled PT services to address these deficits and reach maximum level of function.    Recent Surgery: * No surgery found *      Plan:     During this hospitalization, patient to be seen 3 x/week (2-3x per week) to address the identified rehab impairments via gait training, therapeutic activities, therapeutic exercises, neuromuscular re-education and progress toward the following goals:    Plan of Care Expires:  10/05/24    Subjective     Chief Complaint: Dizziness   Patient/Family Comments/goals: Pt agreed to participate in therapy.   Pain/Comfort:  Pain Rating 1: 0/10  Location - Side 1: Bilateral  Location - Orientation 1: generalized    Patients cultural, spiritual, Buddhism conflicts given the current situation: no    Living Environment:  Lives in apartment with his son. No stairs. Standard toilet. Tub/shower unit  Prior to admission, patients level of function was independent with ADL's and IADL's and  ambulation.  Equipment used at home: none.  DME owned (not currently used): none.  Upon discharge, patient will have assistance from Family.    Objective:     Communicated with nurse prior to session.  Patient found up in chair with telemetry  upon PT entry to room.    General Precautions: Standard, fall  Orthopedic Precautions:N/A   Braces: N/A  Respiratory Status: Room air    Exams:  Cognitive Exam:  Patient is oriented to Person, Place, and Situation  Postural Exam:  Patient presented with the following abnormalities:    -       No postural abnormalities identified  Sensation:    -       Intact  Skin Integrity/Edema:      -       Skin integrity: Visible skin intact  RLE ROM: WNL  RLE Strength: WNL  LLE ROM: WNL  LLE Strength: WNL    Functional Mobility:  Bed Mobility:     Supine to Sit: stand by assistance and of 2 persons  Sit to Supine: stand by assistance  Transfers:     Sit to Stand:  contact guard assistance and of 1 persons with no AD  Gait: ambulated 26 ft with CGAx1 no AD. Slow pace walk, no lost of balance. No knee giving out  Balance: Seated and standing static balance: Good      AM-PAC 6 CLICK MOBILITY  Total Score:24       Treatment & Education:  Gait training: Ambulate 26 ft  CGAx1. V/c's required to avoid looking down.     Patient left up in chair with all lines intact, call button in reach, nurse notified, and daughter present.    GOALS:   Multidisciplinary Problems       Physical Therapy Goals          Problem: Physical Therapy    Goal Priority Disciplines Outcome Interventions   Physical Therapy Goal     PT, PT/OT Progressing    Description: Goals to be met by: 10/05/2024     Patient will increase functional independence with mobility by performin. Supine to sit with Modified [place]  2. Sit to supine with Modified [place]  3. Sit to stand transfer with Modified [place]  4. Gait  x 100 feet with Modified [place] using No Assistive Device.   5. Lower extremity exercise  program x30 reps per handout, with independence                         History:     History reviewed. No pertinent past medical history.    History reviewed. No pertinent surgical history.    Time Tracking:     PT Received On: 09/28/24  PT Start Time: 1012     PT Stop Time: 1033  PT Total Time (min): 21 min     Billable Minutes: Evaluation 11 min and Gait Training 10 min      09/28/2024

## 2024-09-28 NOTE — PT/OT/SLP EVAL
"Occupational Therapy   Evaluation    Name: Radames Tarango  MRN: [id number]  Admitting Diagnosis: CVA (cerebral vascular accident)  Recent Surgery: * No surgery found *      Recommendations:     Discharge Recommendations: Low Intensity Therapy  Discharge Equipment Recommendations:  shower chair   Barriers to discharge:  None    Assessment:     Radames Tarango is a 64 y.o. male with a medical diagnosis of CVA (cerebral vascular accident). Performance deficits affecting function: weakness, gait instability, impaired balance, decreased upper extremity function, decreased safety awareness, impaired coordination, impaired fine motor, decreased ROM, decreased coordination.      Rehab Prognosis: Good; patient would benefit from acute skilled OT services to address these deficits and reach maximum level of function.       Plan:     Patient to be seen  (1-2x/wk) to address the above listed problems via self-care/home management, therapeutic activities, therapeutic exercises  Plan of Care Expires: 10/12/24  Plan of Care Reviewed with: patient, daughter    Subjective     Chief Complaint: none stated; reports symptoms have somewhat resolved; has been ambulating to bathroom but still endorsed some  "weakness"  Patient/Family Comments/goals: agreeable to participate; dtr reports pt's decreased FM skills in RUE are causing him to drop utensils     Occupational Profile:  Living Environment: Pt lives w/ son in [facility].   Previous level of function: Pt was (I) w/ ADLs and functional mobility.   Roles and Routines: Drives short distances but stopped ~2 months ago.   Equipment Used at Home: none  Assistance upon Discharge: pt has 5 children     Pain/Comfort:  Pain Rating 1: 0/10  Pain Rating Post-Intervention 1: 0/10    Patients cultural, spiritual, Nondenominational conflicts given the current situation: no    Objective:     Communicated with: Nurse prior to session.  Patient found HOB elevated with   upon OT entry to room.    General Precautions: " "Standard, fall  Orthopedic Precautions: N/A  Braces: N/A  Respiratory Status: Room air    BP Vital Signs:   Supine: 229/105; HR 69  EOB: 228/102; HR 67    Occupational Performance:    Bed Mobility:    Patient completed Scooting/Bridging with supervision  Patient completed Supine to Sit with supervision    Functional Mobility/Transfers:  Patient completed Sit <> Stand Transfer x 1 trial from EOB with stand by assistance  with  no assistive device   Patient completed Bed <> Chair Transfer using Step Transfer technique with contact guard assistance with no assistive device  Functional Mobility: Pt was able to ambulate functional distances w/ CGA and no AD; pt endorsed feeling "off balance: but no LOB occurred. Refer to PT note for details.     Activities of Daily Living:  Upper Body Dressing: minimum assistance for donning gown over back     Cognitive/Visual Perceptual:  Cognitive/Psychosocial Skills:     -       Oriented to: Person, Place, Time, and Situation   -       Follows Commands/attention:Follows multistep  commands  -       Communication: mild dysarthria   -       Memory: No Deficits noted  -       Safety awareness/insight to disability: intact     Physical Exam:  Balance:    -       good sitting balance; fair + standing   Postural examination/scapula alignment:    -       No postural abnormalities identified  Skin integrity: Visible skin intact  Edema:  None noted  Sensation:    -       Intact but reports some tingling to RUE   Dominant hand:    -       RUE  Upper Extremity Range of Motion:     -       Right Upper Extremity: WFL but limited shoulder flexion   -       Left Upper Extremity: WFL  Upper Extremity Strength:    -       Right Upper Extremity:  4+/5   -       Left Upper Extremity: WFL   Strength:    -       Right Upper Extremity: WFL  -       Left Upper Extremity: WFL  Fine Motor Coordination:    -       Intact  Left hand, finger to nose, Right hand, finger to nose, Left hand thumb/finger " "opposition skills, Right hand thumb/finger opposition skills, Left hand, diadochokinesis skill , and Right hand, diadochokinesis skill ; pt w/ mild delay to RUE w/ assessment but overall, WFL     AMPAC 6 Click ADL:  AMPAC Total Score: 24    Treatment & Education:  -Initial eval complete.   -Pt educated on role of OT and POC.   -Pt educate on safe functional mobility and ADL performance.   -Pt provided w/ yellow thera-putty w/ handout; educated on importance/benefits of remaining compliant w/ there-ex program to gain full functional return of RUE.   -Pt provided w/ foam tubing to place on utensils for "building up" handles as both pt and dtr report that pt has been having difficulty w/ maintaining  on utensils, resulting in dropping items.   -Encouraged pt to report any worsening symptoms to nurses/staff.     Patient left up in chair with all lines intact and call button in reach    GOALS:   Multidisciplinary Problems       Occupational Therapy Goals          Problem: Occupational Therapy    Goal Priority Disciplines Outcome Interventions   Occupational Therapy Goal     OT, PT/OT Progressing    Description: Goals to be met by: 10/12/24     Patient will increase functional independence with ADLs by performing:    Feeding with [place].  LE Dressing with [place].  Grooming while standing at sink with [place].  Supine to sit with [place].  Step transfer with [place]  Toilet transfer to toilet with [place].  Upper extremity exercise program (thera-putty therex) x 10-15  reps per handout, with independence.                         History:     History reviewed. No pertinent past medical history.    History reviewed. No pertinent surgical history.    Time Tracking:     OT Date of Treatment: 09/28/24  OT Start Time: 1012  OT Stop Time: 1033  OT Total Time (min): 21 min    Billable Minutes:Evaluation 10  Therapeutic Activity 11  Total Time 21 (co-eval w/ PT)     9/28/2024  "

## 2024-09-28 NOTE — ASSESSMENT & PLAN NOTE
Consulted neuro:  CTA head/neck Impression:CTA head: Unremarkable CTA of the head specifically without evidence for proximal significant stenosis or occlusion.CTA neck: No significant arterial stenosis throughout the neck..2.5 cm hypodense lesion left lobe of the thyroid follow-up nonemergent thyroid ultrasound advised.  CT head: No acute intracranial hemorrhage or sulcal effacement to suggest large territory recent infarction.  Patchy hypoattenuation supratentorial white matter while nonspecific suggestive for chronic ischemic change.  Subcentimeter hypodensity right insula suggestive for possible lacunar type infarct overall age indeterminate and may be remote.  MRI brain: Small bandlike region of acute infarction left corona radiata extending to the posterior external capsule.  No significant mass effect or hemorrhagic conversionSuperimposed remote right subinsular lacunar type infarcts and patchy and confluent T2 FLAIR signal hyperintensity elsewhere supratentorial white matter while nonspecific concerning for underlying chronic ischemic change      Plan:  -re-consult tele neuro   -echo pending   - after loading with ASA 325mg and Plavix 300mg. Daily aspirin 81 mg /  clopidogrel 75 mg x 21 days followed by ASA 81mg OD monotherapy therafter  - started on Lipitor 40 mg daily  -Transthoracic ECHO (TTE). Holter monitor at d/c if TTE is negative   -Permissive HTN x 24 hrs post index event / long term < 140/90   -Goal Parameters for TIA/stroke: LDL<70 mg/dl, HbA1C: <7.0 %,  SBP<130/80 (discussed risk factor optimization in order to reduce the risk of future events with help of PCP)  -PT/OT evaluation and therapy goals per their recommendations  -Diet and Exercise: Discussed aggressive lifestyle modification. Eat primarily plant-based foods, such as fruits and vegetables, whole grains, legumes (beans) and nuts. Discussed Mediterranean diet. Daily exercise (150 minutes per week).  -Provided education regarding future  stroke identification: I went over the usual stroke warning signs and symptoms, and the need to activate EMS (call 911) as soon as the symptoms present including-sudden onset numbness or weakness of the face, arm, or leg; especially on one side of the body; sudden confusion, trouble speaking, or understanding; sudden trouble seeing in one or both eyes; sudden trouble walking; dizziness; loss of coordination.  - follow up in Neurology Clinic as outpatient

## 2024-09-28 NOTE — AI DETERIORATION ALERT
Artificial Intelligence Notification  [facility]  [place] [address]  Phone: [phone]    This documentation was triggered by an Artificial Intelligence Notification:    Admit Date: 2024   LOS: 1  Code Status: Full Code  : 1960  Age: 64 y.o.  Weight:   Wt Readings from Last 1 Encounters:   24 92.9 kg (204 lb 12.9 oz)        Sex: male  Bed: [facility]  MRN: [id number]  Attending Physician: Donald Live, Corey     Date of Alert: 2024  Time AI Alert Received: 12:12 AM              Vitals:    24 2356   BP: (!) 239/114   Pulse: 66   Resp: 18   Temp: 99.5 °F (37.5 °C)     SpO2: 100 %      Artificial Intelligence alert discussed with Provider:     Name: Sly   Date/Time of Provider Notification: 12:12 AM        Patient Condition: Alert generated due to hypertension. No acute issues from primary RN. BP to be managed per PRN orders. Care ongoing

## 2024-09-28 NOTE — ASSESSMENT & PLAN NOTE
Start on nifedipine 90 mg daily, losartan 100 mg daily, hydralazine 100 mg every 8 hours, labetalol 300 mg every 12, hours, HCTZ 25 mg daily   Monitor BP   Clonidine 0.2 mg every 4 hours as needed  Added renal artery stenosis US pending

## 2024-09-29 LAB
ALBUMIN SERPL BCP-MCNC: 3.7 G/DL (ref 3.5–5.2)
ALP SERPL-CCNC: 60 U/L (ref 55–135)
ALT SERPL W/O P-5'-P-CCNC: 13 U/L (ref 10–44)
ANION GAP SERPL CALC-SCNC: 10 MMOL/L (ref 8–16)
AST SERPL-CCNC: 21 U/L (ref 10–40)
BASOPHILS # BLD AUTO: 0.03 K/UL (ref 0–0.2)
BASOPHILS NFR BLD: 0.6 % (ref 0–1.9)
BILIRUB SERPL-MCNC: 0.9 MG/DL (ref 0.1–1)
BUN SERPL-MCNC: 20 MG/DL (ref 8–23)
CALCIUM SERPL-MCNC: 8.6 MG/DL (ref 8.7–10.5)
CHLORIDE SERPL-SCNC: 107 MMOL/L (ref 95–110)
CO2 SERPL-SCNC: 22 MMOL/L (ref 23–29)
CREAT SERPL-MCNC: 1.3 MG/DL (ref 0.5–1.4)
DIFFERENTIAL METHOD BLD: ABNORMAL
EOSINOPHIL # BLD AUTO: 0.1 K/UL (ref 0–0.5)
EOSINOPHIL NFR BLD: 2.1 % (ref 0–8)
ERYTHROCYTE [DISTWIDTH] IN BLOOD BY AUTOMATED COUNT: 11.8 % (ref 11.5–14.5)
EST. GFR  (NO RACE VARIABLE): >60 ML/MIN/1.73 M^2
GLUCOSE SERPL-MCNC: 96 MG/DL (ref 70–110)
HCT VFR BLD AUTO: 41.4 % (ref 40–54)
HGB BLD-MCNC: 13.4 G/DL (ref 14–18)
IMM GRANULOCYTES # BLD AUTO: 0.01 K/UL (ref 0–0.04)
IMM GRANULOCYTES NFR BLD AUTO: 0.2 % (ref 0–0.5)
LYMPHOCYTES # BLD AUTO: 2 K/UL (ref 1–4.8)
LYMPHOCYTES NFR BLD: 37 % (ref 18–48)
MCH RBC QN AUTO: 30.9 PG (ref 27–31)
MCHC RBC AUTO-ENTMCNC: 32.4 G/DL (ref 32–36)
MCV RBC AUTO: 96 FL (ref 82–98)
MONOCYTES # BLD AUTO: 0.5 K/UL (ref 0.3–1)
MONOCYTES NFR BLD: 8.8 % (ref 4–15)
NEUTROPHILS # BLD AUTO: 2.7 K/UL (ref 1.8–7.7)
NEUTROPHILS NFR BLD: 51.3 % (ref 38–73)
NRBC BLD-RTO: 0 /100 WBC
PLATELET # BLD AUTO: 225 K/UL (ref 150–450)
PMV BLD AUTO: 10.1 FL (ref 9.2–12.9)
POCT GLUCOSE: 132 MG/DL (ref 70–110)
POCT GLUCOSE: 134 MG/DL (ref 70–110)
POTASSIUM SERPL-SCNC: 3.9 MMOL/L (ref 3.5–5.1)
PROT SERPL-MCNC: 6.4 G/DL (ref 6–8.4)
RBC # BLD AUTO: 4.33 M/UL (ref 4.6–6.2)
SODIUM SERPL-SCNC: 139 MMOL/L (ref 136–145)
WBC # BLD AUTO: 5.32 K/UL (ref 3.9–12.7)

## 2024-09-29 PROCEDURE — 63600175 PHARM REV CODE 636 W HCPCS: Performed by: NURSE PRACTITIONER

## 2024-09-29 PROCEDURE — 25000003 PHARM REV CODE 250: Performed by: NURSE PRACTITIONER

## 2024-09-29 PROCEDURE — 80053 COMPREHEN METABOLIC PANEL: CPT | Performed by: NURSE PRACTITIONER

## 2024-09-29 PROCEDURE — 36415 COLL VENOUS BLD VENIPUNCTURE: CPT | Performed by: NURSE PRACTITIONER

## 2024-09-29 PROCEDURE — 99900035 HC TECH TIME PER 15 MIN (STAT)

## 2024-09-29 PROCEDURE — 21400001 HC TELEMETRY ROOM

## 2024-09-29 PROCEDURE — 11000001 HC ACUTE MED/SURG PRIVATE ROOM

## 2024-09-29 PROCEDURE — 94761 N-INVAS EAR/PLS OXIMETRY MLT: CPT

## 2024-09-29 PROCEDURE — 63600175 PHARM REV CODE 636 W HCPCS: Performed by: STUDENT IN AN ORGANIZED HEALTH CARE EDUCATION/TRAINING PROGRAM

## 2024-09-29 PROCEDURE — 25000003 PHARM REV CODE 250: Performed by: STUDENT IN AN ORGANIZED HEALTH CARE EDUCATION/TRAINING PROGRAM

## 2024-09-29 PROCEDURE — 25000003 PHARM REV CODE 250: Performed by: HOSPITALIST

## 2024-09-29 PROCEDURE — 85025 COMPLETE CBC W/AUTO DIFF WBC: CPT | Performed by: NURSE PRACTITIONER

## 2024-09-29 RX ORDER — NIFEDIPINE 30 MG/1
90 TABLET, EXTENDED RELEASE ORAL DAILY
Status: DISCONTINUED | OUTPATIENT
Start: 2024-09-29 | End: 2024-09-29

## 2024-09-29 RX ORDER — HYDROCHLOROTHIAZIDE 25 MG/1
25 TABLET ORAL DAILY
Status: DISCONTINUED | OUTPATIENT
Start: 2024-09-29 | End: 2024-09-29

## 2024-09-29 RX ORDER — LABETALOL 100 MG/1
300 TABLET, FILM COATED ORAL EVERY 12 HOURS
Status: DISCONTINUED | OUTPATIENT
Start: 2024-09-29 | End: 2024-09-29

## 2024-09-29 RX ORDER — HYDROCHLOROTHIAZIDE 25 MG/1
25 TABLET ORAL DAILY
Status: DISCONTINUED | OUTPATIENT
Start: 2024-09-29 | End: 2024-09-30 | Stop reason: HOSPADM

## 2024-09-29 RX ORDER — CLONIDINE HYDROCHLORIDE 0.1 MG/1
0.2 TABLET ORAL EVERY 4 HOURS PRN
Status: DISCONTINUED | OUTPATIENT
Start: 2024-09-29 | End: 2024-09-30 | Stop reason: HOSPADM

## 2024-09-29 RX ORDER — NIFEDIPINE 30 MG/1
90 TABLET, EXTENDED RELEASE ORAL DAILY
Status: DISCONTINUED | OUTPATIENT
Start: 2024-09-29 | End: 2024-09-30 | Stop reason: HOSPADM

## 2024-09-29 RX ORDER — LOSARTAN POTASSIUM 25 MG/1
100 TABLET ORAL DAILY
Status: DISCONTINUED | OUTPATIENT
Start: 2024-09-29 | End: 2024-09-30 | Stop reason: HOSPADM

## 2024-09-29 RX ADMIN — HYDRALAZINE HYDROCHLORIDE 100 MG: 25 TABLET ORAL at 09:09

## 2024-09-29 RX ADMIN — LABETALOL HYDROCHLORIDE 10 MG: 5 INJECTION INTRAVENOUS at 04:09

## 2024-09-29 RX ADMIN — LOSARTAN POTASSIUM 100 MG: 25 TABLET, FILM COATED ORAL at 06:09

## 2024-09-29 RX ADMIN — ACETAMINOPHEN 650 MG: 325 TABLET ORAL at 09:09

## 2024-09-29 RX ADMIN — ACETAMINOPHEN 650 MG: 325 TABLET ORAL at 10:09

## 2024-09-29 RX ADMIN — Medication 6 MG: at 09:09

## 2024-09-29 RX ADMIN — ATORVASTATIN CALCIUM 40 MG: 40 TABLET, FILM COATED ORAL at 09:09

## 2024-09-29 RX ADMIN — HYDROCHLOROTHIAZIDE 25 MG: 25 TABLET ORAL at 06:09

## 2024-09-29 RX ADMIN — CLOPIDOGREL BISULFATE 75 MG: 75 TABLET ORAL at 09:09

## 2024-09-29 RX ADMIN — LABETALOL HYDROCHLORIDE 300 MG: 100 TABLET, FILM COATED ORAL at 09:09

## 2024-09-29 RX ADMIN — ENOXAPARIN SODIUM 40 MG: 40 INJECTION SUBCUTANEOUS at 05:09

## 2024-09-29 RX ADMIN — HYDRALAZINE HYDROCHLORIDE 100 MG: 25 TABLET ORAL at 06:09

## 2024-09-29 RX ADMIN — HYDRALAZINE HYDROCHLORIDE 100 MG: 25 TABLET ORAL at 01:09

## 2024-09-29 RX ADMIN — ASPIRIN 81 MG: 81 TABLET, COATED ORAL at 09:09

## 2024-09-29 RX ADMIN — NIFEDIPINE 90 MG: 30 TABLET, FILM COATED, EXTENDED RELEASE ORAL at 06:09

## 2024-09-29 NOTE — PLAN OF CARE
Problem: Adult Inpatient Plan of Care  Goal: Plan of Care Review  Outcome: Progressing  Goal: Patient-Specific Goal (Individualized)  Outcome: Progressing  Goal: Absence of Hospital-Acquired Illness or Injury  Outcome: Progressing  Goal: Optimal Comfort and Wellbeing  Outcome: Progressing  Goal: Readiness for Transition of Care  Outcome: Progressing     Problem: Skin Injury Risk Increased  Goal: Skin Health and Integrity  Outcome: Progressing     Problem: Stroke, Ischemic (Includes Transient Ischemic Attack)  Goal: Effective Bowel Elimination  Outcome: Progressing  Goal: Optimal Cerebral Tissue Perfusion  Outcome: Progressing  Goal: Optimal Cognitive Function  Outcome: Progressing  Goal: Improved Communication Skills  Outcome: Progressing  Goal: Optimal Functional Ability  Outcome: Progressing  Goal: Optimal Nutrition Intake  Outcome: Progressing  Goal: Effective Oxygenation and Ventilation  Outcome: Progressing  Goal: Improved Sensorimotor Function  Outcome: Progressing  Goal: Safe and Effective Swallow  Outcome: Progressing  Goal: Effective Urinary Elimination  Outcome: Progressing

## 2024-09-29 NOTE — PROGRESS NOTES
[facility] Medicine  Progress Note    Patient Name: Radames Tarango  MRN: [id number]  Patient Class: IP- Inpatient   Admission Date: 9/26/2024  Length of Stay: 2 days  Attending Physician: Donald Live, *  Primary Care Provider: Rebeca, Primary Doctor        Subjective:     Principal Problem:CVA (cerebral vascular accident)        HPI:  This is a 64-year-old male with no significant past medical history who presents with dizziness.      Patient presents for acute on chronic dizziness that started 4 months prior to presentation and worsened on the day of presentation.  Associated symptoms include bilateral lower extremity weakness, slurred speech and bilateral numbness.    In the ED, patient was hypertensive (170-210s/70-100s).  Labs were unremarkable.  CT head showed no acute abnormality.  Patient was given aspirin 325 mg, clonidine 0.2 mg p.o., hydralazine 10 mg IV, amlodipine 10 mg p.o., and meclizine 25 mg p.o..  He was transferred from [facility] ED to [facility] for upgrade of care.    On examination: patient daughter and patient reports: slurred speech, right hand writing off, headaches, leg weakness. His has a mild right facial droop, right sided arm drift  as well as ataxia, slurred speech currently. NIH scale of 4.  Patient being worked up for stroke.                 Overview/Hospital Course:  No notes on file    Interval History: patient seen and examined. BP overnight 200/100s throughout the night. Adjustement medication added HCTZ 25 mg, losartan 100 mg, nifedipine 90 mg, hydralazine 100 mg, and labetalol 300 mg. Ordered renal artery stenosis US.      Review of Systems   Gastrointestinal:  Positive for nausea.   Genitourinary:  Positive for frequency.   Musculoskeletal:  Positive for arthralgias.   Neurological:  Positive for dizziness, facial asymmetry, speech difficulty, weakness, numbness and headaches.     Objective:     Vital Signs (Most Recent):  Temp: 98.5 °F (36.9 °C)  (09/29/24 0753)  Pulse: 71 (09/29/24 0821)  Resp: 20 (09/29/24 0753)  BP: (!) 183/88 (09/29/24 0753)  SpO2: 99 % (09/29/24 0753) Vital Signs (24h Range):  Temp:  [97.9 °F (36.6 °C)-98.5 °F (36.9 °C)] 98.5 °F (36.9 °C)  Pulse:  [59-75] 71  Resp:  [17-20] 20  SpO2:  [93 %-99 %] 99 %  BP: (171-242)/() 183/88     Weight: 92.9 kg (204 lb 12.9 oz)  Body mass index is 32.08 kg/m².    Intake/Output Summary (Last 24 hours) at 9/29/2024 0842  Last data filed at 9/28/2024 1822  Gross per 24 hour   Intake 480 ml   Output --   Net 480 ml         Physical Exam  Constitutional:       General: He is not in acute distress.     Appearance: He is not ill-appearing.   HENT:      Head: Normocephalic and atraumatic.   Eyes:      Extraocular Movements: Extraocular movements intact.   Neck:      Comments: Lipoma on back of neck   Cardiovascular:      Rate and Rhythm: Normal rate and regular rhythm.   Pulmonary:      Effort: Pulmonary effort is normal.      Breath sounds: Normal breath sounds.   Abdominal:      General: Bowel sounds are normal.      Palpations: Abdomen is soft.   Musculoskeletal:         General: Normal range of motion.   Skin:     General: Skin is warm and dry.   Neurological:      Mental Status: He is alert and oriented to person, place, and time. Mental status is at baseline.      Comments: a mild right-sided facial droop, right-sided arm drift as well as ataxia and slurred speech.    Psychiatric:         Mood and Affect: Mood normal.         Behavior: Behavior normal.         Thought Content: Thought content normal.         Judgment: Judgment normal.             Significant Labs: All pertinent labs within the past 24 hours have been reviewed.    Significant Imaging: I have reviewed all pertinent imaging results/findings within the past 24 hours.    Assessment/Plan:      * CVA (cerebral vascular accident)  Consulted neuro:  CTA head/neck Impression:CTA head: Unremarkable CTA of the head specifically without  evidence for proximal significant stenosis or occlusion.CTA neck: No significant arterial stenosis throughout the neck..2.5 cm hypodense lesion left lobe of the thyroid follow-up nonemergent thyroid ultrasound advised.  CT head: No acute intracranial hemorrhage or sulcal effacement to suggest large territory recent infarction.  Patchy hypoattenuation supratentorial white matter while nonspecific suggestive for chronic ischemic change.  Subcentimeter hypodensity right insula suggestive for possible lacunar type infarct overall age indeterminate and may be remote.  MRI brain: Small bandlike region of acute infarction left corona radiata extending to the posterior external capsule.  No significant mass effect or hemorrhagic conversionSuperimposed remote right subinsular lacunar type infarcts and patchy and confluent T2 FLAIR signal hyperintensity elsewhere supratentorial white matter while nonspecific concerning for underlying chronic ischemic change      Plan:     -echo:    Left Ventricle: The left ventricle is normal in size. There is mild concentric hypertrophy. There is normal systolic function with a visually estimated ejection fraction of 60 - 65%. Grade I diastolic dysfunction.    Right Ventricle: Normal right ventricular cavity size. Systolic function is normal.    Left Atrium: Left atrium is mildly dilated.    Pulmonary Artery: The estimated pulmonary artery systolic pressure is 25 mmHg.    IVC/SVC: Normal venous pressure at 3 mmHg.     - after loading with ASA 325mg and Plavix 300mg. Daily aspirin 81 mg /  clopidogrel 75 mg x 21 days followed by ASA 81mg OD monotherapy therafter  - started on Lipitor 40 mg daily  -Holter monitor at d/c if TTE is negative   -Permissive HTN x 24 hrs post index event / long term < 140/90   -Goal Parameters for TIA/stroke: LDL<70 mg/dl, HbA1C: <7.0 %,  SBP<130/80 (discussed risk factor optimization in order to reduce the risk of future events with help of PCP)  -PT/OT evaluation  and therapy goals per their recommendations  -Diet and Exercise: Discussed aggressive lifestyle modification. Eat primarily plant-based foods, such as fruits and vegetables, whole grains, legumes (beans) and nuts. Discussed Mediterranean diet. Daily exercise (150 minutes per week).  -Provided education regarding future stroke identification: I went over the usual stroke warning signs and symptoms, and the need to activate EMS (call 911) as soon as the symptoms present including-sudden onset numbness or weakness of the face, arm, or leg; especially on one side of the body; sudden confusion, trouble speaking, or understanding; sudden trouble seeing in one or both eyes; sudden trouble walking; dizziness; loss of coordination.  - follow up in Neurology Clinic as outpatient      Lipoma of skin and subcutaneous tissue of neck  Referral general surgeon on discharge       Inverted papilloma of nasal cavity  MRI brain showed Heterogeneous signal material fills the right maxillary antra extending to the nasal cavity raising concern for possible inverted papilloma versus polyp  Clinical correlation and follow-up nonemergent ENT evaluation recommended.      Hypertensive urgency  Start on nifedipine 90 mg daily, losartan 100 mg daily, hydralazine 100 mg every 8 hours, labetalol 300 mg every 12, hours, HCTZ 25 mg daily   Monitor BP   Clonidine 0.2 mg every 4 hours as needed  Added renal artery stenosis US pending       Thyroid lesion  Incidental finding on CT  Follow up outpatient with US  Follow up with PCP         Elevated TSH  TSH 4.907 elevated, T4 wnl  Will repeat TSH in 6 weeks outpatient with PCP  Hold off on meds for now         VTE Risk Mitigation (From admission, onward)           Ordered     enoxaparin injection 40 mg  Daily         09/27/24 0027     IP VTE HIGH RISK PATIENT  Once         09/27/24 0027     Place sequential compression device  Until discontinued         09/27/24 0027                    Discharge  Planning   JOVON:      Code Status: Full Code   Is the patient medically ready for discharge?:     Reason for patient still in hospital (select all that apply): Patient trending condition  Discharge Plan A: Home with family                  Juan Dotson NP  Department of [facility] Medicine   [facility]

## 2024-09-29 NOTE — PLAN OF CARE
Case Management Assessment     PCP: Miles Mcclelland Jr., NP   Pharmacy:      Patient Arrived From: Home  Existing Help at Home: DaughterCrystal    Barriers to Discharge: None    Discharge Plan:    A. Home Health    B. Home Health       CM met with pt and pt's daughters, Crystal and Jazzmine at bedside. Pt reside with his adult son. Pt does not use any DME at home. Pt's daughter will provide needed support and transportation home upon discharge.     Pt reported that he drinks alcohol daily. CM asked if pt would like substance abuse resources, pt declined.      I provided the patient a choice of post acute providers and offered a list of CMS rated Home Health      CM sent Home Health referrals via [employer].    Patient has declined to select a preferred provider and elects placement with the first accepting in network provider that is available to provide services as ordered by the physician.        1:33 pm- [facility] will be added to pt's chart.     09/29/24 1029   Discharge Assessment   Assessment Type Discharge Planning Assessment   Confirmed/corrected address, phone number and insurance Yes   Confirmed Demographics Correct on Facesheet   Source of Information family;patient   When was your last doctors appointment?   (Unknown)   Communicated JOVON with patient/caregiver Yes   Reason For Admission CVA   People in Home child(emanuel), adult   Facility Arrived From: Home   Do you expect to return to your current living situation? Yes   Do you have help at home or someone to help you manage your care at home? Yes   Prior to hospitilization cognitive status: Alert/Oriented   Current cognitive status: Alert/Oriented   Walking or Climbing Stairs Difficulty no   Dressing/Bathing Difficulty no   Equipment Currently Used at Home none   Readmission within 30 days? No   Patient currently being followed by outpatient case management? No   Do you currently have service(s) that help  you manage your care at home? No   Do you take prescription medications? Yes   Do you have prescription coverage? Yes   Coverage PHN   Do you have any problems affording any of your prescribed medications? No   Is the patient taking medications as prescribed? yes   Who is going to help you get home at discharge? DaughterCrystal   How do you get to doctors appointments? car, drives self   Are you on dialysis? No   Do you take coumadin? No   Discharge Plan A Home Health   Discharge Plan B Home Health   DME Needed Upon Discharge  none   Discharge Plan discussed with: Patient   Transition of Care Barriers None   SDOH   (None)   Physical Activity   On average, how many days per week do you engage in moderate to strenuous exercise (like a brisk walk)? 7 days   On average, how many minutes do you engage in exercise at this level? 30 min   Financial Resource Strain   How hard is it for you to pay for the very basics like food, housing, medical care, and heating? Not hard   Housing Stability   In the last 12 months, was there a time when you were not able to pay the mortgage or rent on time? N   At any time in the past 12 months, were you homeless or living in a shelter (including now)? N   Transportation Needs   Has the lack of transportation kept you from medical appointments, meetings, work or from getting things needed for daily living? No   Food Insecurity   Within the past 12 months, you worried that your food would run out before you got the money to buy more. Never true   Within the past 12 months, the food you bought just didn't last and you didn't have money to get more. Never true   Stress   Do you feel stress - tense, restless, nervous, or anxious, or unable to sleep at night because your mind is troubled all the time - these days? Very much   Social Isolation   How often do you feel lonely or isolated from those around you?  Never   Alcohol Use   Q1: How often do you have a drink containing alcohol? Never   Q2:  How many drinks containing alcohol do you have on a typical day when you are drinking? None   Q3: How often do you have six or more drinks on one occasion? Never   Utilities   In the past 12 months has the electric, gas, oil, or water company threatened to shut off services in your home? No   Health Literacy   How often do you need to have someone help you when you read instructions, pamphlets, or other written material from your doctor or pharmacy? Never

## 2024-09-29 NOTE — NURSING
Nurses Note -- 4 Eyes      9/28/2024   10:30 PM      Skin assessed during: Q Shift Change      [x] No Pressure Injuries Present    [x]Prevention Measures Documented      [] Yes- Altered Skin Integrity Present or Discovered   [] LDA Added if Not in Epic (Describe Wound)   [] New Altered Skin Integrity was Present on Admit and Documented in LDA   [] Wound Image Taken      Attending Nurse:  Alejandra Vidales RN/Staff Member:  Louisa

## 2024-09-29 NOTE — ASSESSMENT & PLAN NOTE
Consulted neuro:  CTA head/neck Impression:CTA head: Unremarkable CTA of the head specifically without evidence for proximal significant stenosis or occlusion.CTA neck: No significant arterial stenosis throughout the neck..2.5 cm hypodense lesion left lobe of the thyroid follow-up nonemergent thyroid ultrasound advised.  CT head: No acute intracranial hemorrhage or sulcal effacement to suggest large territory recent infarction.  Patchy hypoattenuation supratentorial white matter while nonspecific suggestive for chronic ischemic change.  Subcentimeter hypodensity right insula suggestive for possible lacunar type infarct overall age indeterminate and may be remote.  MRI brain: Small bandlike region of acute infarction left corona radiata extending to the posterior external capsule.  No significant mass effect or hemorrhagic conversionSuperimposed remote right subinsular lacunar type infarcts and patchy and confluent T2 FLAIR signal hyperintensity elsewhere supratentorial white matter while nonspecific concerning for underlying chronic ischemic change      Plan:     -echo:    Left Ventricle: The left ventricle is normal in size. There is mild concentric hypertrophy. There is normal systolic function with a visually estimated ejection fraction of 60 - 65%. Grade I diastolic dysfunction.    Right Ventricle: Normal right ventricular cavity size. Systolic function is normal.    Left Atrium: Left atrium is mildly dilated.    Pulmonary Artery: The estimated pulmonary artery systolic pressure is 25 mmHg.    IVC/SVC: Normal venous pressure at 3 mmHg.     - after loading with ASA 325mg and Plavix 300mg. Daily aspirin 81 mg /  clopidogrel 75 mg x 21 days followed by ASA 81mg OD monotherapy therafter  - started on Lipitor 40 mg daily  -Holter monitor at d/c if TTE is negative   -Permissive HTN x 24 hrs post index event / long term < 140/90   -Goal Parameters for TIA/stroke: LDL<70 mg/dl, HbA1C: <7.0 %,  SBP<130/80 (discussed risk  factor optimization in order to reduce the risk of future events with help of PCP)  -PT/OT evaluation and therapy goals per their recommendations  -Diet and Exercise: Discussed aggressive lifestyle modification. Eat primarily plant-based foods, such as fruits and vegetables, whole grains, legumes (beans) and nuts. Discussed Mediterranean diet. Daily exercise (150 minutes per week).  -Provided education regarding future stroke identification: I went over the usual stroke warning signs and symptoms, and the need to activate EMS (call 911) as soon as the symptoms present including-sudden onset numbness or weakness of the face, arm, or leg; especially on one side of the body; sudden confusion, trouble speaking, or understanding; sudden trouble seeing in one or both eyes; sudden trouble walking; dizziness; loss of coordination.  - follow up in [facility] Clinic as outpatient

## 2024-09-29 NOTE — SUBJECTIVE & OBJECTIVE
Interval History: patient seen and examined. BP overnight 200/100s throughout the night. Adjustement medication added HCTZ 25 mg, losartan 100 mg, nifedipine 90 mg, hydralazine 100 mg, and labetalol 300 mg. Ordered renal artery stenosis US.      Review of Systems   Gastrointestinal:  Positive for nausea.   Genitourinary:  Positive for frequency.   Musculoskeletal:  Positive for arthralgias.   Neurological:  Positive for dizziness, facial asymmetry, speech difficulty, weakness, numbness and headaches.     Objective:     Vital Signs (Most Recent):  Temp: 98.5 °F (36.9 °C) (09/29/24 0753)  Pulse: 71 (09/29/24 0821)  Resp: 20 (09/29/24 0753)  BP: (!) 183/88 (09/29/24 0753)  SpO2: 99 % (09/29/24 0753) Vital Signs (24h Range):  Temp:  [97.9 °F (36.6 °C)-98.5 °F (36.9 °C)] 98.5 °F (36.9 °C)  Pulse:  [59-75] 71  Resp:  [17-20] 20  SpO2:  [93 %-99 %] 99 %  BP: (171-242)/() 183/88     Weight: 92.9 kg (204 lb 12.9 oz)  Body mass index is 32.08 kg/m².    Intake/Output Summary (Last 24 hours) at 9/29/2024 0842  Last data filed at 9/28/2024 1822  Gross per 24 hour   Intake 480 ml   Output --   Net 480 ml         Physical Exam  Constitutional:       General: He is not in acute distress.     Appearance: He is not ill-appearing.   HENT:      Head: Normocephalic and atraumatic.   Eyes:      Extraocular Movements: Extraocular movements intact.   Neck:      Comments: Lipoma on back of neck   Cardiovascular:      Rate and Rhythm: Normal rate and regular rhythm.   Pulmonary:      Effort: Pulmonary effort is normal.      Breath sounds: Normal breath sounds.   Abdominal:      General: Bowel sounds are normal.      Palpations: Abdomen is soft.   Musculoskeletal:         General: Normal range of motion.   Skin:     General: Skin is warm and dry.   Neurological:      Mental Status: He is alert and oriented to person, place, and time. Mental status is at baseline.      Comments: a mild right-sided facial droop, right-sided arm drift as  Most cases of acute bronchitis are due to viruses. Acute bronchitis is one of the most common causes of ANTIBIOTIC ABUSE. Acute bronchitis often cannot be distinguished from a simple upper respiratory infection in the first few days of illness; furthermore, acute bronchitis is often associated with simple upper respiratory tract infection. Acute bronchitis is suggested by the persistence of cough for more than five days. In patients with acute bronchitis, cough generally persists two to three weeks, and airway hyperreactivity may last five to six weeks. Fever (over 100.5 degrees) is relatively unusual in acute bronchitis and suggests either influenza or pneumonia    ACUTE SINUSITIS OVERVIEW -- Rhinosinusitis, or more commonly sinusitis, is the medical term for inflammation (swelling) of the lining of the sinuses and nose. The sinuses are the hollow areas within the facial bones that are connected to the nasal openings. The sinuses are lined with mucous membranes, similar to the inside of the nose.  There are two main types of sinusitis: acute and chronic. Acute sinusitis is inflammation that lasts for less than four weeks, subacute sinusitis lasts from 4 to 12 weeks, while chronic sinusitis lasts for more than 12 weeks. Acute sinusitis is common.    ACUTE SINUSITIS CAUSES -- The most common cause of acute sinusitis is a VIRAL infection associated with the common cold.   **Bacterial sinusitis occurs much less commonly, in only 0.5 to 2 percent of cases, usually as a complication of viral sinusitis.**  Because antibiotics are effective only against bacterial, and NOT viral, infections, most people with acute sinusitis do NOT need antibiotics, and would be putting themselves at risk for medication side effects and developing ANTIBIOTICS RESISTANCE by taking them for nonbacterial sinusitis.  ACUTE SINUSITIS SYMPTOMS -- Symptoms of acute sinusitis include:  ?Thick, yellow to green discharge from the nose  ?Nasal  congestion or blockage  ?Facial pain, pressure, or fullness  Other acute sinusitis symptoms can include fever (temperature greater than 100.4ºF or 38ºC), fatigue, cough, difficulty or inability to smell, ear pressure or fullness, headache, and bad breath. In most cases, these symptoms develop over the course of one day and begin to improve by 7 to 10 days.  DO I HAVE VIRAL OR BACTERIAL SINUSITIS? -- It is difficult to know if you have a viral or bacterial sinus infection initially. Studies show that duration of symptoms cannot always be used to distinguish between viral and bacterial sinusitis, even when lasting longer than 7 to 10 days.  If symptoms of sinusitis last MORE than 10 days, you have these symptoms that initially improve but then worsen again within the first seven days (\"double-worsening\"), or you have severe symptoms (eg, fever >102°F AND thick, yellow to green discharge from your nose for three or four days in a row when your illness starts), you may have bacterial sinusitis.  DO I NEED TO BE EXAMINED? -- If you have one or more of the following symptoms, you should seek medical attention immediately (even if symptoms have been present for less than seven days):  ?Persistent high fever (>102°F)  ?Sudden, severe pain in the face or head  ?Double vision or difficulty seeing  ?Confusion or difficulty thinking clearly  ?Swelling or redness around one or both eyes  ?Stiff neck  You may also want to see a healthcare provider if you have symptoms that last more than 10 days, have severe symptoms (eg, fever >102°F or thick, yellow to green discharge from the nose), or symptoms that initially improve and worsen again.  ACUTE SINUSITIS TREATMENT -- Treatment for sinusitis involves symptom relief and may or may not include antibiotic therapy. You should speak with your healthcare provider about whether or not you need antibiotic therapy. Bacterial and viral sinusitis can often improve with nonantibiotic  well as ataxia and slurred speech.    Psychiatric:         Mood and Affect: Mood normal.         Behavior: Behavior normal.         Thought Content: Thought content normal.         Judgment: Judgment normal.             Significant Labs: All pertinent labs within the past 24 hours have been reviewed.    Significant Imaging: I have reviewed all pertinent imaging results/findings within the past 24 hours.   treatment, although bacterial sinusitis can also worsen and require antibiotic treatment.  Symptomatic treatment -- Symptomatic treatment of a sinus infection aims to relieve symptoms. These treatments do not shorten the duration of illness.  Pain relief -- Nonprescription pain medications, such as acetaminophen (eg, Tylenol) or ibuprofen (eg, Motrin, Advil), are recommended for pain.  Nasal irrigation -- Flushing the nose and sinuses with a saline solution several times per day has been proven to decrease pain associated with congestion and shorten the duration of symptoms.   Nasal steroids -- Nasal steroids (steroids delivered by a nasal spray) can help to reduce swelling inside the nose, usually within two to three days. These drugs have few side effects and relieve symptoms in most people.  There are a number of nasal steroids available by prescription, and one (triamcinolone or Nasacort) is available without a prescription. These drugs are all effective, but differ in how frequently they must be used and how much they cost.  Other treatments  ?Oral decongestants - Oral decongestants (most commonly pseudoephedrine and phenylephrine) may be helpful if you have associated symptoms of ear pain or fullness.  ?Nasal decongestant sprays - Nasal decongestant sprays, including oxymetazoline (Afrin) and phenylephrine (Joshua-synephrine), can be used to temporarily treat congestion. However, these sprays should not be used for more than two to three days due to the risk of rebound congestion (when the nose becomes congested constantly unless the medication is used repeatedly), possible addiction, and long-term consequences of frequent use, including persistent nasal dryness and crusting, which is very difficult to treat once it has developed.  ?Oral antihistamines - Oral antihistamines (such as diphenhydramine/Benadryl) are not proven to improve symptoms of sinusitis and can have unwanted side effects.  ?Mucolytics -  Medications to thin secretions (such as guaifenesin) may help to clear mucus.  Observation -- Observation (continuing to watch and wait) is an option for treatment for many patients. You should speak with your healthcare provider about whether or not this is the best option for you.  Watching and waiting is a reasonable option because up to 75 percent of people with bacterial sinusitis improve within one month without antibiotics. During the watch and wait period, treatments to improve symptoms are recommended. If symptoms worsen with observation, treatment with an antibiotic is usually started.  Antibiotics -- Bacterial sinusitis does not always need to be treated with antibiotics as many patients improve without antibiotics. You should speak with your healthcare provider about whether or not you need antibiotics.

## 2024-09-30 VITALS
HEIGHT: 67 IN | DIASTOLIC BLOOD PRESSURE: 73 MMHG | TEMPERATURE: 98 F | BODY MASS INDEX: 32.15 KG/M2 | SYSTOLIC BLOOD PRESSURE: 153 MMHG | OXYGEN SATURATION: 96 % | WEIGHT: 204.81 LBS | RESPIRATION RATE: 18 BRPM | HEART RATE: 70 BPM

## 2024-09-30 PROBLEM — I63.312 THROMBOTIC STROKE INVOLVING LEFT MIDDLE CEREBRAL ARTERY: Status: ACTIVE | Noted: 2024-09-27

## 2024-09-30 PROCEDURE — 97535 SELF CARE MNGMENT TRAINING: CPT

## 2024-09-30 PROCEDURE — 97110 THERAPEUTIC EXERCISES: CPT

## 2024-09-30 PROCEDURE — 97116 GAIT TRAINING THERAPY: CPT

## 2024-09-30 PROCEDURE — 92523 SPEECH SOUND LANG COMPREHEN: CPT

## 2024-09-30 PROCEDURE — 97530 THERAPEUTIC ACTIVITIES: CPT

## 2024-09-30 PROCEDURE — 25000003 PHARM REV CODE 250: Performed by: NURSE PRACTITIONER

## 2024-09-30 PROCEDURE — 97164 PT RE-EVAL EST PLAN CARE: CPT

## 2024-09-30 PROCEDURE — 99900031 HC PATIENT EDUCATION (STAT)

## 2024-09-30 PROCEDURE — 99233 SBSQ HOSP IP/OBS HIGH 50: CPT | Mod: ,,, | Performed by: STUDENT IN AN ORGANIZED HEALTH CARE EDUCATION/TRAINING PROGRAM

## 2024-09-30 PROCEDURE — 94761 N-INVAS EAR/PLS OXIMETRY MLT: CPT

## 2024-09-30 PROCEDURE — 92610 EVALUATE SWALLOWING FUNCTION: CPT

## 2024-09-30 PROCEDURE — 99900035 HC TECH TIME PER 15 MIN (STAT)

## 2024-09-30 PROCEDURE — 25000003 PHARM REV CODE 250: Performed by: STUDENT IN AN ORGANIZED HEALTH CARE EDUCATION/TRAINING PROGRAM

## 2024-09-30 RX ORDER — CLOPIDOGREL BISULFATE 75 MG/1
75 TABLET ORAL DAILY
Qty: 16 TABLET | Refills: 0 | Status: SHIPPED | OUTPATIENT
Start: 2024-10-01 | End: 2024-10-17

## 2024-09-30 RX ORDER — HYDROCHLOROTHIAZIDE 25 MG/1
25 TABLET ORAL DAILY
Qty: 30 TABLET | Refills: 11 | Status: SHIPPED | OUTPATIENT
Start: 2024-10-01 | End: 2025-10-01

## 2024-09-30 RX ORDER — LABETALOL 100 MG/1
200 TABLET, FILM COATED ORAL EVERY 12 HOURS
Status: DISCONTINUED | OUTPATIENT
Start: 2024-09-30 | End: 2024-09-30 | Stop reason: HOSPADM

## 2024-09-30 RX ORDER — ASPIRIN 81 MG/1
81 TABLET ORAL DAILY
Qty: 365 TABLET | Refills: 0 | Status: SHIPPED | OUTPATIENT
Start: 2024-10-01 | End: 2025-10-01

## 2024-09-30 RX ORDER — LOSARTAN POTASSIUM 100 MG/1
100 TABLET ORAL DAILY
Qty: 90 TABLET | Refills: 3 | Status: SHIPPED | OUTPATIENT
Start: 2024-10-01 | End: 2025-10-01

## 2024-09-30 RX ORDER — NIFEDIPINE 90 MG/1
90 TABLET, EXTENDED RELEASE ORAL DAILY
Qty: 30 TABLET | Refills: 11 | Status: SHIPPED | OUTPATIENT
Start: 2024-10-01 | End: 2025-10-01

## 2024-09-30 RX ORDER — LABETALOL 200 MG/1
200 TABLET, FILM COATED ORAL EVERY 12 HOURS
Qty: 60 TABLET | Refills: 11 | Status: SHIPPED | OUTPATIENT
Start: 2024-09-30 | End: 2025-09-30

## 2024-09-30 RX ORDER — ATORVASTATIN CALCIUM 40 MG/1
40 TABLET, FILM COATED ORAL DAILY
Qty: 90 TABLET | Refills: 3 | Status: SHIPPED | OUTPATIENT
Start: 2024-10-01 | End: 2025-10-01

## 2024-09-30 RX ORDER — HYDRALAZINE HYDROCHLORIDE 100 MG/1
100 TABLET, FILM COATED ORAL EVERY 8 HOURS
Qty: 90 TABLET | Refills: 11 | Status: SHIPPED | OUTPATIENT
Start: 2024-09-30 | End: 2025-09-30

## 2024-09-30 RX ADMIN — NIFEDIPINE 90 MG: 30 TABLET, FILM COATED, EXTENDED RELEASE ORAL at 09:09

## 2024-09-30 RX ADMIN — LOSARTAN POTASSIUM 100 MG: 25 TABLET, FILM COATED ORAL at 09:09

## 2024-09-30 RX ADMIN — HYDROCHLOROTHIAZIDE 25 MG: 25 TABLET ORAL at 09:09

## 2024-09-30 RX ADMIN — ATORVASTATIN CALCIUM 40 MG: 40 TABLET, FILM COATED ORAL at 09:09

## 2024-09-30 RX ADMIN — CLOPIDOGREL BISULFATE 75 MG: 75 TABLET ORAL at 09:09

## 2024-09-30 RX ADMIN — ACETAMINOPHEN 650 MG: 325 TABLET ORAL at 09:09

## 2024-09-30 RX ADMIN — ASPIRIN 81 MG: 81 TABLET, COATED ORAL at 09:09

## 2024-09-30 RX ADMIN — HYDRALAZINE HYDROCHLORIDE 100 MG: 25 TABLET ORAL at 01:09

## 2024-09-30 RX ADMIN — HYDRALAZINE HYDROCHLORIDE 100 MG: 25 TABLET ORAL at 05:09

## 2024-09-30 RX ADMIN — LABETALOL HYDROCHLORIDE 200 MG: 100 TABLET, FILM COATED ORAL at 09:09

## 2024-09-30 RX ADMIN — POLYETHYLENE GLYCOL 3350 17 G: 17 POWDER, FOR SOLUTION ORAL at 09:09

## 2024-09-30 NOTE — ASSESSMENT & PLAN NOTE
As per initial consult note:  64 y.o. year old male with no known past medical history presented to the [facility] ER with symptoms of dizziness and weakness in the legs.  History obtained from patient and chart review.     Patient states that he has been feeling slight headache and dizziness over the last few weeks along with pain in bilateral hips and resulting subjective weakness of the legs.  Was doing okay when he went to bed on 9/25.  Yesterday morning when he woke up, he felt sudden onset of dizziness and his legs gave out causing him to have a minor fall.  He also noted that his right arm dexterity was poor.  Daughter at the bedside has noticed that his speech is slurred as well.  Denies prior similar symptoms before this.  He is a current smoker, smokes 1 pack a day.  Drinks 1-2 beers everyday.  Does not take home aspirin.  Not under medical care and does not have known diagnosis of hypertension.     In the ED, /105, NSR on EKG.  Patient was not noted to have any neurological deficits and was out of window for acute therapy.  CT head obtained was unremarkable.  Patient was transferred to  inpatient for further evaluation.  Upon my evaluation this morning, patient noted to have a mild right-sided facial droop, right-sided arm drift as well as ataxia and slurred speech.  NIHSS 4.    MRI brain: Small bandlike region of acute infarction left corona radiata extending to the posterior external capsule. No significant mass effect or hemorrhagic conversion. Superimposed remote right subinsular lacunar type infarcts and patchy and confluent T2 FLAIR signal hyperintensity elsewhere supratentorial white matter while nonspecific concerning for underlying chronic ischemic change  CTA: No FRANCOIS etiology   Echo f/u negative for any immediate high risk cardio embolic etiologies / needing considerations for AC      Exam: awake, alert, following commands, no obvious aphasia or neglect or visual field deficits, RUE drift  and distal weakness / no focal drift in RLE / right lower facial droop and slurred speech   Concerns of worsening right sided weakness UE > LE; with associated gait instability. Interval CT head - evolving left basal ganglia infarct / wo HT or new infarcts. Otherwise no new focal motor or sensory or cranial nerve deficits.     Recs:  Thrombotic stroke involving left MCA vascular territory     - DAPT X 21 day, ASA 81/Plavix 75, with asa thereafter  - target LDL < 70 / Continue atorvastatin  - euglycemic goals   - permissive HTN x 72 hrs post index event / followed by gradual reduction towards long term < 140/90     - PT/OT recs - discharge planning   - F/u PCP for risk factor modification monitoring  -  on DASH diet; exercise and lifestyle modifications when appropriate   - Provide stroke counseling during the visit - Identification of signs; emergency action and ER attention; Risk factor control, optimization for secondary stroke prevention; Compliance to medications     - F/u vascular neurology

## 2024-09-30 NOTE — PROGRESS NOTES
[facility]  Neurology  Progress Note    Patient Name: Radames Tarango  MRN: [id number]  Admission Date: 9/26/2024  Hospital Length of Stay: 3 days  Code Status: Full Code   Attending Provider: Donald Live, *  Primary Care Physician: Miles Mcclelland Jr., NP   Principal Problem:Thrombotic stroke involving left middle cerebral artery    HPI:   HPI:   Radames Tarango is a 64 y.o. year old male with no known past medical history presented to the [facility] ER with symptoms of dizziness and weakness in the legs.  History obtained from patient and chart review.     Patient states that he has been feeling slight headache and dizziness over the last few weeks along with pain in bilateral hips and resulting subjective weakness of the legs.  Was doing okay when he went to bed on 9/25.  Yesterday morning when he woke up, he felt sudden onset of dizziness and his legs gave out causing him to have a minor fall.  He also noted that his right arm dexterity was poor.  Daughter at the bedside has noticed that his speech is slurred as well.  Denies prior similar symptoms before this.  He is a current smoker, smokes 1 pack a day.  Drinks 1-2 beers everyday.  Does not take home aspirin.  Not under medical care and does not have known diagnosis of hypertension.     In the ED, /105, NSR on EKG.  Patient was not noted to have any neurological deficits and was out of window for acute therapy.  CT head obtained was unremarkable.  Patient was transferred to  inpatient for further evaluation.  Upon my evaluation this morning, patient noted to have a mild right-sided facial droop, right-sided arm drift as well as ataxia and slurred speech.  NIHSS 4.    Subjective:     Interval History:     Concerns of worsening right sided weakness UE > LE; with associated gait instability. Interval CT head - evolving left basal ganglia infarct / wo HT or new infarcts. Otherwise no new focal motor or sensory or cranial nerve deficits.      Current Neurological Medications:     Current Facility-Administered Medications   Medication Dose Route Frequency Provider Last Rate Last Admin    acetaminophen tablet 650 mg  650 mg Oral Q6H PRN Nila, Manicia S., NP   650 mg at 09/30/24 0931    albuterol-ipratropium 2.5 mg-0.5 mg/3 mL nebulizer solution 3 mL  3 mL Nebulization Q4H PRN Samuel Heart MD        aluminum-magnesium hydroxide-simethicone 200-200-20 mg/5 mL suspension 30 mL  30 mL Oral QID PRN Samuel Heart MD        aspirin EC tablet 81 mg  81 mg Oral Daily Nila, Manicia S., NP   81 mg at 09/30/24 0920    atorvastatin tablet 40 mg  40 mg Oral Daily Nila, Manicia S., NP   40 mg at 09/30/24 0921    bisacodyL suppository 10 mg  10 mg Rectal Daily PRN Samuel Heart MD        cloNIDine tablet 0.2 mg  0.2 mg Oral Q4H PRN Donald Live MD        clopidogreL tablet 75 mg  75 mg Oral Daily Nila, Manicia S., NP   75 mg at 09/30/24 0920    dextrose 10% bolus 125 mL 125 mL  12.5 g Intravenous PRN Samuel Heart MD        dextrose 10% bolus 250 mL 250 mL  25 g Intravenous PRN Samuel Heart MD        enoxaparin injection 40 mg  40 mg Subcutaneous Daily Samuel Heart MD   40 mg at 09/29/24 1700    glucagon (human recombinant) injection 1 mg  1 mg Intramuscular PRN Samuel Heart MD        glucose chewable tablet 16 g  16 g Oral PRN SalSamuel mitchell MD        glucose chewable tablet 24 g  24 g Oral PRN SalSamuel mitchell MD        hydrALAZINE tablet 100 mg  100 mg Oral Q8H Nila, Manicia S., NP   100 mg at 09/30/24 0530    hydroCHLOROthiazide tablet 25 mg  25 mg Oral Daily Nila, Manicia S., NP   25 mg at 09/30/24 0921    labetaloL tablet 200 mg  200 mg Oral Q12H Nila, Manicia S., NP   200 mg at 09/30/24 0921    losartan tablet 100 mg  100 mg Oral Daily Nila, Manicia S., NP   100 mg at 09/30/24 0920    magnesium oxide tablet 800 mg  800 mg Oral PRN Samuel Heart MD        magnesium oxide tablet 800 mg  800 mg Oral PRN Samuel Heart MD         melatonin tablet 6 mg  6 mg Oral Nightly PRN Samuel Heart MD   6 mg at 09/29/24 2120    naloxone 0.4 mg/mL injection 0.02 mg  0.02 mg Intravenous PRN Samuel Heart MD        NIFEdipine 24 hr tablet 90 mg  90 mg Oral Daily Nila Steveted ALEXIS, NP   90 mg at 09/30/24 0921    nitroGLYCERIN SL tablet 0.4 mg  0.4 mg Sublingual Q5 Min PRN Samuel Heart MD        ondansetron injection 4 mg  4 mg Intravenous Q8H PRN Samuel Heart MD        polyethylene glycol packet 17 g  17 g Oral Daily Samuel Heart MD   17 g at 09/30/24 0920    potassium bicarbonate disintegrating tablet 35 mEq  35 mEq Oral PRN Samuel Heart MD        potassium bicarbonate disintegrating tablet 50 mEq  50 mEq Oral PRN Samuel Heart MD        potassium bicarbonate disintegrating tablet 60 mEq  60 mEq Oral PRN Samuel Heart MD        potassium, sodium phosphates 280-160-250 mg packet 2 packet  2 packet Oral PRN Samuel Heart MD        potassium, sodium phosphates 280-160-250 mg packet 2 packet  2 packet Oral PRN Samuel Heart MD        potassium, sodium phosphates 280-160-250 mg packet 2 packet  2 packet Oral PRN Samuel Heart MD        prochlorperazine injection Soln 5 mg  5 mg Intravenous Q6H PRN Samuel Heart MD        simethicone chewable tablet 80 mg  1 tablet Oral QID PRN Samuel Heart MD        sodium chloride 0.9% flush 10 mL  10 mL Intravenous Q12H PRN Samuel Heart MD           Review of Systems   Constitutional:  Negative for chills and fever.   Respiratory:  Negative for shortness of breath.    Cardiovascular:  Negative for chest pain.   Gastrointestinal:  Negative for abdominal pain.   Neurological:  Positive for facial asymmetry, weakness and numbness.   Psychiatric/Behavioral:  Negative for agitation and behavioral problems.      Objective:     Vital Signs (Most Recent):  Temp: 98.2 °F (36.8 °C) (09/30/24 1120)  Pulse: 69 (09/30/24 1120)  Resp: 18 (09/30/24 1120)  BP: (!) 152/73 (09/30/24 1120)  SpO2: 95 % (09/30/24 1120) Vital Signs (24h  "Range):  Temp:  [97.5 °F (36.4 °C)-98.7 °F (37.1 °C)] 98.2 °F (36.8 °C)  Pulse:  [62-77] 69  Resp:  [18-20] 18  SpO2:  [95 %-99 %] 95 %  BP: (132-177)/(73-94) 152/73     Weight: 92.9 kg (204 lb 12.9 oz)  Body mass index is 32.08 kg/m².     Physical Exam  HENT:      Head: Normocephalic and atraumatic.   Eyes:      Extraocular Movements: Extraocular movements intact and EOM normal.   Pulmonary:      Effort: No respiratory distress.   Neurological:      Mental Status: He is alert and oriented to person, place, and time.   Psychiatric:         Speech: Speech is slurred.          NEUROLOGICAL EXAMINATION:     MENTAL STATUS   Oriented to person, place, and time.   Attention: normal. Concentration: normal.   Speech: slurred   Level of consciousness: alert    CRANIAL NERVES     CN III, IV, VI   Extraocular motions are normal.   Nystagmus: none   Ophthalmoparesis: none    CN VII   Right facial weakness: central    MOTOR EXAM        RUE drift / None in LE      GAIT AND COORDINATION     Tremor   Resting tremor: absent  Intention tremor: absent  Action tremor: absent      Significant Labs: Hemoglobin A1c:   Recent Labs   Lab 09/27/24  0452   HGBA1C 4.3     Blood Culture: No results for input(s): "LABBLOO" in the last 48 hours.  BMP:   Recent Labs   Lab 09/29/24  0402   GLU 96      K 3.9      CO2 22*   BUN 20   CREATININE 1.3   CALCIUM 8.6*     CBC:   Recent Labs   Lab 09/29/24  0402   WBC 5.32   HGB 13.4*   HCT 41.4        CMP:   Recent Labs   Lab 09/29/24  0402   GLU 96      K 3.9      CO2 22*   BUN 20   CREATININE 1.3   CALCIUM 8.6*   PROT 6.4   ALBUMIN 3.7   BILITOT 0.9   ALKPHOS 60   AST 21   ALT 13   ANIONGAP 10     CSF Culture: No results for input(s): "CSFCULTURE" in the last 48 hours.  CSF Studies: No results for input(s): "ALIQUT", "APPEARCSF", "COLORCSF", "CSFWBC", "CSFRBC", "GLUCCSF", "LDHCSF", "PROTEINCSF", "VDRLCSF" in the last 48 hours.  Inflammatory Markers: No results for input(s): " ""SEDRATE", "CRP", "PROCAL" in the last 48 hours.  POCT Glucose: No results for input(s): "POCTGLUCOSE" in the last 24 hours.  Prealbumin: No results for input(s): "PREALBUMIN" in the last 48 hours.  Respiratory Culture: No results for input(s): "GSRESP", "RESPIRATORYC" in the last 48 hours.  Urine Culture: No results for input(s): "LABURIN" in the last 48 hours.  Urine Studies: No results for input(s): "COLORU", "APPEARANCEUA", "PHUR", "SPECGRAV", "PROTEINUA", "GLUCUA", "KETONESU", "BILIRUBINUA", "OCCULTUA", "NITRITE", "UROBILINOGEN", "LEUKOCYTESUR", "RBCUA", "WBCUA", "BACTERIA", "SQUAMEPITHEL", "HYALINECASTS" in the last 48 hours.    Invalid input(s): "WRIGHTSUR"  Recent Lab Results       None          All pertinent lab results from the past 24 hours have been reviewed.    Significant Imaging: I have reviewed and interpreted all pertinent imaging results/findings within the past 24 hours.  Assessment and Plan:     * Thrombotic stroke involving left middle cerebral artery  As per initial consult note:  64 y.o. year old male with no known past medical history presented to the [facility] ER with symptoms of dizziness and weakness in the legs.  History obtained from patient and chart review.     Patient states that he has been feeling slight headache and dizziness over the last few weeks along with pain in bilateral hips and resulting subjective weakness of the legs.  Was doing okay when he went to bed on 9/25.  Yesterday morning when he woke up, he felt sudden onset of dizziness and his legs gave out causing him to have a minor fall.  He also noted that his right arm dexterity was poor.  Daughter at the bedside has noticed that his speech is slurred as well.  Denies prior similar symptoms before this.  He is a current smoker, smokes 1 pack a day.  Drinks 1-2 beers everyday.  Does not take home aspirin.  Not under medical care and does not have known diagnosis of hypertension.     In the ED, /105, NSR on EKG.  " Patient was not noted to have any neurological deficits and was out of window for acute therapy.  CT head obtained was unremarkable.  Patient was transferred to  inpatient for further evaluation.  Upon my evaluation this morning, patient noted to have a mild right-sided facial droop, right-sided arm drift as well as ataxia and slurred speech.  NIHSS 4.    MRI brain: Small bandlike region of acute infarction left corona radiata extending to the posterior external capsule. No significant mass effect or hemorrhagic conversion. Superimposed remote right subinsular lacunar type infarcts and patchy and confluent T2 FLAIR signal hyperintensity elsewhere supratentorial white matter while nonspecific concerning for underlying chronic ischemic change  CTA: No FRANCOIS etiology   Echo f/u negative for any immediate high risk cardio embolic etiologies / needing considerations for AC      Exam: awake, alert, following commands, no obvious aphasia or neglect or visual field deficits, RUE drift and distal weakness / no focal drift in RLE / right lower facial droop and slurred speech   Concerns of worsening right sided weakness UE > LE; with associated gait instability. Interval CT head - evolving left basal ganglia infarct / wo HT or new infarcts. Otherwise no new focal motor or sensory or cranial nerve deficits.     Recs:  Thrombotic stroke involving left MCA vascular territory     - DAPT X 21 day, ASA 81/Plavix 75, with asa thereafter  - target LDL < 70 / Continue atorvastatin  - euglycemic goals   - permissive HTN x 72 hrs post index event / followed by gradual reduction towards long term < 140/90     - PT/OT recs - discharge planning   - F/u PCP for risk factor modification monitoring  -  on DASH diet; exercise and lifestyle modifications when appropriate   - Provide stroke counseling during the visit - Identification of signs; emergency action and ER attention; Risk factor control, optimization for secondary stroke  prevention; Compliance to medications     - F/u vascular neurology         VTE Risk Mitigation (From admission, onward)           Ordered     enoxaparin injection 40 mg  Daily         09/27/24 0027     IP VTE HIGH RISK PATIENT  Once         09/27/24 0027     Place sequential compression device  Until discontinued         09/27/24 0027                    Hussein Ferrer MD  Neurology  [facility] [place] - Telemetry

## 2024-09-30 NOTE — SUBJECTIVE & OBJECTIVE
Subjective:     Interval History:     Concerns of worsening right sided weakness UE > LE; with associated gait instability. Interval CT head - evolving left basal ganglia infarct / wo HT or new infarcts. Otherwise no new focal motor or sensory or cranial nerve deficits.     Current Neurological Medications:     Current Facility-Administered Medications   Medication Dose Route Frequency Provider Last Rate Last Admin    acetaminophen tablet 650 mg  650 mg Oral Q6H PRN Nila, Manicia S., NP   650 mg at 09/30/24 0931    albuterol-ipratropium 2.5 mg-0.5 mg/3 mL nebulizer solution 3 mL  3 mL Nebulization Q4H PRN Samuel Heart MD        aluminum-magnesium hydroxide-simethicone 200-200-20 mg/5 mL suspension 30 mL  30 mL Oral QID PRN Samuel Heart MD        aspirin EC tablet 81 mg  81 mg Oral Daily Nila, Steveicia S., NP   81 mg at 09/30/24 0920    atorvastatin tablet 40 mg  40 mg Oral Daily NilaSteveicia S., NP   40 mg at 09/30/24 0921    bisacodyL suppository 10 mg  10 mg Rectal Daily PRN SalSamuel mitchell MD        cloNIDine tablet 0.2 mg  0.2 mg Oral Q4H PRN Donald Live MD        clopidogreL tablet 75 mg  75 mg Oral Daily NilaSteveicia S., NP   75 mg at 09/30/24 0920    dextrose 10% bolus 125 mL 125 mL  12.5 g Intravenous PRN Samuel Heart MD        dextrose 10% bolus 250 mL 250 mL  25 g Intravenous PRN Samuel Heart MD        enoxaparin injection 40 mg  40 mg Subcutaneous Daily Samuel Heart MD   40 mg at 09/29/24 1700    glucagon (human recombinant) injection 1 mg  1 mg Intramuscular PRN Samuel Heart MD        glucose chewable tablet 16 g  16 g Oral PRN Samuel Heart MD        glucose chewable tablet 24 g  24 g Oral PRN Samuel Heart MD        hydrALAZINE tablet 100 mg  100 mg Oral Q8H NilaSteveicia S., NP   100 mg at 09/30/24 0530    hydroCHLOROthiazide tablet 25 mg  25 mg Oral Daily NilaSteveicia S., NP   25 mg at 09/30/24 0921    labetaloL tablet 200 mg  200 mg Oral Q12H Juan Dotson  S., NP   200 mg at 09/30/24 0921    losartan tablet 100 mg  100 mg Oral Daily NilaViniciusa S., NP   100 mg at 09/30/24 0920    magnesium oxide tablet 800 mg  800 mg Oral PRN Samuel Heart MD        magnesium oxide tablet 800 mg  800 mg Oral PRN Samuel Heart MD        melatonin tablet 6 mg  6 mg Oral Nightly PRN Samuel Heart MD   6 mg at 09/29/24 2120    naloxone 0.4 mg/mL injection 0.02 mg  0.02 mg Intravenous PRN Samuel Heart MD        NIFEdipine 24 hr tablet 90 mg  90 mg Oral Daily Nila, Juan S., NP   90 mg at 09/30/24 0921    nitroGLYCERIN SL tablet 0.4 mg  0.4 mg Sublingual Q5 Min PRN Samuel Heart MD        ondansetron injection 4 mg  4 mg Intravenous Q8H PRN Samuel Heart MD        polyethylene glycol packet 17 g  17 g Oral Daily Samuel Heart MD   17 g at 09/30/24 0920    potassium bicarbonate disintegrating tablet 35 mEq  35 mEq Oral PRN Samuel Heart MD        potassium bicarbonate disintegrating tablet 50 mEq  50 mEq Oral PRN Samuel Heart MD        potassium bicarbonate disintegrating tablet 60 mEq  60 mEq Oral PRN Samuel Heart MD        potassium, sodium phosphates 280-160-250 mg packet 2 packet  2 packet Oral PRN Samuel Heart MD        potassium, sodium phosphates 280-160-250 mg packet 2 packet  2 packet Oral PRN Samuel Heart MD        potassium, sodium phosphates 280-160-250 mg packet 2 packet  2 packet Oral PRN Samuel Heart MD        prochlorperazine injection Soln 5 mg  5 mg Intravenous Q6H PRN Samuel Heart MD        simethicone chewable tablet 80 mg  1 tablet Oral QID PRN Samuel Heart MD        sodium chloride 0.9% flush 10 mL  10 mL Intravenous Q12H PRN Samuel Heart MD           Review of Systems   Constitutional:  Negative for chills and fever.   Respiratory:  Negative for shortness of breath.    Cardiovascular:  Negative for chest pain.   Gastrointestinal:  Negative for abdominal pain.   Neurological:  Positive for facial asymmetry, weakness and numbness.  "  Psychiatric/Behavioral:  Negative for agitation and behavioral problems.      Objective:     Vital Signs (Most Recent):  Temp: 98.2 °F (36.8 °C) (09/30/24 1120)  Pulse: 69 (09/30/24 1120)  Resp: 18 (09/30/24 1120)  BP: (!) 152/73 (09/30/24 1120)  SpO2: 95 % (09/30/24 1120) Vital Signs (24h Range):  Temp:  [97.5 °F (36.4 °C)-98.7 °F (37.1 °C)] 98.2 °F (36.8 °C)  Pulse:  [62-77] 69  Resp:  [18-20] 18  SpO2:  [95 %-99 %] 95 %  BP: (132-177)/(73-94) 152/73     Weight: 92.9 kg (204 lb 12.9 oz)  Body mass index is 32.08 kg/m².     Physical Exam  HENT:      Head: Normocephalic and atraumatic.   Eyes:      Extraocular Movements: Extraocular movements intact and EOM normal.   Pulmonary:      Effort: No respiratory distress.   Neurological:      Mental Status: He is alert and oriented to person, place, and time.   Psychiatric:         Speech: Speech is slurred.          NEUROLOGICAL EXAMINATION:     MENTAL STATUS   Oriented to person, place, and time.   Attention: normal. Concentration: normal.   Speech: slurred   Level of consciousness: alert    CRANIAL NERVES     CN III, IV, VI   Extraocular motions are normal.   Nystagmus: none   Ophthalmoparesis: none    CN VII   Right facial weakness: central    MOTOR EXAM        RUE drift / None in LE      GAIT AND COORDINATION     Tremor   Resting tremor: absent  Intention tremor: absent  Action tremor: absent      Significant Labs: Hemoglobin A1c:   Recent Labs   Lab 09/27/24  0452   HGBA1C 4.3     Blood Culture: No results for input(s): "LABBLOO" in the last 48 hours.  BMP:   Recent Labs   Lab 09/29/24  0402   GLU 96      K 3.9      CO2 22*   BUN 20   CREATININE 1.3   CALCIUM 8.6*     CBC:   Recent Labs   Lab 09/29/24  0402   WBC 5.32   HGB 13.4*   HCT 41.4        CMP:   Recent Labs   Lab 09/29/24  0402   GLU 96      K 3.9      CO2 22*   BUN 20   CREATININE 1.3   CALCIUM 8.6*   PROT 6.4   ALBUMIN 3.7   BILITOT 0.9   ALKPHOS 60   AST 21   ALT 13 " "  ANIONGAP 10     CSF Culture: No results for input(s): "CSFCULTURE" in the last 48 hours.  CSF Studies: No results for input(s): "ALIQUT", "APPEARCSF", "COLORCSF", "CSFWBC", "CSFRBC", "GLUCCSF", "LDHCSF", "PROTEINCSF", "VDRLCSF" in the last 48 hours.  Inflammatory Markers: No results for input(s): "SEDRATE", "CRP", "PROCAL" in the last 48 hours.  POCT Glucose: No results for input(s): "POCTGLUCOSE" in the last 24 hours.  Prealbumin: No results for input(s): "PREALBUMIN" in the last 48 hours.  Respiratory Culture: No results for input(s): "GSRESP", "RESPIRATORYC" in the last 48 hours.  Urine Culture: No results for input(s): "LABURIN" in the last 48 hours.  Urine Studies: No results for input(s): "COLORU", "APPEARANCEUA", "PHUR", "SPECGRAV", "PROTEINUA", "GLUCUA", "KETONESU", "BILIRUBINUA", "OCCULTUA", "NITRITE", "UROBILINOGEN", "LEUKOCYTESUR", "RBCUA", "WBCUA", "BACTERIA", "SQUAMEPITHEL", "HYALINECASTS" in the last 48 hours.    Invalid input(s): "WRIGHTSUR"  Recent Lab Results       None          All pertinent lab results from the past 24 hours have been reviewed.    Significant Imaging: I have reviewed and interpreted all pertinent imaging results/findings within the past 24 hours.  "

## 2024-09-30 NOTE — SUBJECTIVE & OBJECTIVE
Interval History: patient seen and examined. Re-consulted neuro due concerns of worsening right sided weakness UE > LE; with associated gait instability. Re-consulted PT/OT.     Review of Systems   Constitutional:  Negative for chills and fever.   Respiratory:  Negative for shortness of breath.    Cardiovascular:  Negative for chest pain.   Gastrointestinal:  Negative for abdominal pain.   Neurological:  Positive for facial asymmetry, weakness and numbness.   Psychiatric/Behavioral:  Negative for agitation and behavioral problems.      Objective:     Vital Signs (Most Recent):  Temp: 98.2 °F (36.8 °C) (09/30/24 1120)  Pulse: 73 (09/30/24 1530)  Resp: 18 (09/30/24 1120)  BP: (!) 163/79 (09/30/24 1314)  SpO2: 95 % (09/30/24 1120) Vital Signs (24h Range):  Temp:  [97.5 °F (36.4 °C)-98.7 °F (37.1 °C)] 98.2 °F (36.8 °C)  Pulse:  [62-77] 73  Resp:  [18-20] 18  SpO2:  [95 %-99 %] 95 %  BP: (132-177)/(73-94) 163/79     Weight: 92.9 kg (204 lb 12.9 oz)  Body mass index is 32.08 kg/m².    Intake/Output Summary (Last 24 hours) at 9/30/2024 1544  Last data filed at 9/30/2024 1314  Gross per 24 hour   Intake 480 ml   Output 1100 ml   Net -620 ml         Physical Exam  HENT:      Head: Normocephalic and atraumatic.      Mouth/Throat:      Mouth: Mucous membranes are dry.   Eyes:      Extraocular Movements: Extraocular movements intact.   Cardiovascular:      Rate and Rhythm: Normal rate and regular rhythm.   Pulmonary:      Effort: No respiratory distress.   Neurological:      Mental Status: He is alert and oriented to person, place, and time.      Cranial Nerves: Facial asymmetry present.      Motor: Weakness present.      Gait: Gait abnormal.      Comments: right sided weakness UE > LE; with associated gait instability.   Psychiatric:         Speech: Speech is slurred.             Significant Labs: All pertinent labs within the past 24 hours have been reviewed.    Significant Imaging: I have reviewed all pertinent imaging  results/findings within the past 24 hours.

## 2024-09-30 NOTE — PLAN OF CARE
Problem: Occupational Therapy  Goal: Occupational Therapy Goal  Description: Goals to be met by: 10/12/24     Patient will increase functional independence with ADLs by performing:    Feeding with [place].  LE Dressing with [place].  Grooming while standing at sink with [place].  Supine to sit with [place].  Step transfer with [place]  Toilet transfer to toilet with [place].  Upper extremity exercise program (thera-putty therex) x 10-15  reps per handout, with independence.    9/30/2024 1311 by Romina Munoz, OT  Outcome: Progressing   Patient is experiencing increased pronator drift with right arm, reduced grasp and proprioception with holding utensils requiring more build-up of utensils using coban to increase  to feed and groom himself. He is not having any new onset of visual impairments.

## 2024-09-30 NOTE — NURSING
Patient complaint of symptoms getting worse. The right side of his face, right arm, and right leg feel heavy. His right leg is dragging more when he ambulates. He does not have a headache. Will reach out to provider on staff tonight to update on symptoms. Patient and daughter instructed patient is to remain on bed rest and call nurse for assistance if patient needs to ambulate.

## 2024-09-30 NOTE — PT/OT/SLP PROGRESS
Physical Therapy Treatment    Patient Name:  Radames Tarango   MRN:  [id number]    Recommendations:     Discharge Recommendations: Low Intensity Therapy  Discharge Equipment Recommendations: wheelchair, bedside commode, bath bench  Barriers to discharge:  Close supervision/family assist PRN recommended    Assessment:     Radames Tarango is a 64 y.o. male admitted with a medical diagnosis of Thrombotic stroke involving left middle cerebral artery.  He presents with the following impairments/functional limitations: weakness, impaired endurance, decreased coordination, impaired coordination, impaired self care skills, decreased upper extremity function, impaired fine motor, impaired functional mobility, decreased lower extremity function, gait instability, decreased safety awareness, impaired balance .    Rehab Prognosis: Good; patient would benefit from acute skilled PT services to address these deficits and reach maximum level of function.    Recent Surgery: * No surgery found *      Plan:     During this hospitalization, patient to be seen 5 x/week to address the identified rehab impairments via gait training, therapeutic activities, therapeutic exercises, neuromuscular re-education, wheelchair management/training and progress toward the following goals:    Plan of Care Expires:  10/07/24    Subjective     Chief Complaint: weakness  Patient/Family Comments/goals: was considering IPR, but wants to go home  Pain/Comfort:  Pain Rating 1: 0/10      Objective:     Communicated with nsg prior to session.  Patient found HOB elevated with telemetry upon PT entry to room.     General Precautions: Standard, fall  Orthopedic Precautions: N/A  Braces: N/A  Respiratory Status: Room air     Functional Mobility:  Bed Mobility:     Scooting: supervision  Supine to Sit: supervision  Transfers:     Sit to Stand:  minimum assistance with katie-walker and Vc/TC for forward weight shift over DURAN and hand placement/safety  Chair to chair: Min  "A with VC/TC for forward weight shift over DURAN and hand placement/safety  Gait: Gait training with HW and Max A for 2 LOB's with VC/TC for HW management/safety, sequencing, distribution of weight through UE to HW [id number]  Balance: Fair+ sit, Fair/Fair- stand      AM-PAC 6 CLICK MOBILITY  Turning over in bed (including adjusting bedclothes, sheets and blankets)?: 4  Sitting down on and standing up from a chair with arms (e.g., wheelchair, bedside commode, etc.): 3  Moving from lying on back to sitting on the side of the bed?: 4  Moving to and from a bed to a chair (including a wheelchair)?: 3  Need to walk in hospital room?: 3  Climbing 3-5 steps with a railing?: 1  Basic Mobility Total Score: 18       Treatment & Education:  Transfer and gait training as above.  Educated pt and family on LRAD for ambulation, PT POC/Recommendations for safety, IPR vs HHPT vs OPPT, and to "call before you fall"    Patient left up in chair with all lines intact, call button in reach, nsg/Physician notified, and family present..    GOALS:   Multidisciplinary Problems       Physical Therapy Goals          Problem: Physical Therapy    Goal Priority Disciplines Outcome Interventions   Physical Therapy Goal     PT, PT/OT Not Progressing    Description: Goals to be met by: 10/05/2024     Patient will increase functional independence with mobility by performin. Supine to sit with Modified [place]  2. Sit to supine with Modified [place]  3. Sit to stand transfer with Modified [place]  4. Gait  x 100 feet with Modified [place] using LRAD HW vs cane   5. Lower extremity exercise program x30 reps per handout, with independence                         Time Tracking:     PT Received On: 24  PT Start Time: 1528     PT Stop Time: 1551  PT Total Time (min): 23 min     Billable Minutes: Gait Training 15 and Therapeutic Activity 8    Treatment Type: Treatment  PT/PTA: PT     Number of PTA visits since last PT visit: 0 "     09/30/2024

## 2024-09-30 NOTE — NURSING
PER handoff received from Alejandra, Pt laying in bed awake. NAD noted. No C/O pain.  Fall and safety precaution maintained. Bed alarm activated and audible.Bed loacked in lowest position, with side rails up x2. Call bell and personal items within reach.

## 2024-09-30 NOTE — PT/OT/SLP RE-EVAL
"Physical Therapy Re-evaluation    Patient Name:  Radames Tarango   MRN:  [id number]    Recommendations:     Discharge Recommendations: Low Intensity Therapy (Community based)  Discharge Equipment Recommendations:  (Ongoing assessment for LRAD, HW vs cane)   Barriers to discharge:  Pt with new neuro deficits, increased risk for falls and readmission    Assessment:     Radames Tarango is a 64 y.o. male admitted with a medical diagnosis of CVA (cerebral vascular accident).  He presents with the following impairments/functional limitations: weakness, impaired endurance, decreased coordination, impaired coordination, impaired self care skills, decreased upper extremity function, impaired fine motor, impaired functional mobility, decreased lower extremity function, gait instability, decreased safety awareness, impaired balance .    Rehab Prognosis:  Good; patient would benefit from acute skilled PT services to address these deficits and reach maximum level of function.      Recent Surgery: * No surgery found *      Plan:     During this hospitalization, patient to be seen 5 x/week to address the above listed problems via gait training, therapeutic activities, therapeutic exercises, neuromuscular re-education  Plan of Care Expires:  10/07/24  Plan of Care Reviewed with: patient, daughter    Subjective     Communicated with nsg prior to session.  Patient found sitting edge of bed with telemetry upon PT entry to room, agreeable to evaluation.      Chief Complaint: weakness in R hand and leg, "It feels dead" regarding sensation in R LE   Patient comments/goals: Pt agreeable to re-evaluation  Pain/Comfort:  Pain Rating 1: 0/10    Patients cultural, spiritual, Jehovah's witness conflicts given the current situation: no      Objective:     Patient found with: telemetry     General Precautions: Standard, fall  Orthopedic Precautions: N/A  Braces: N/A  Respiratory Status: Room air    Exams:  Cognitive Exam:  Patient is oriented to Person, " "Place, Time, and Situation  Fine Motor Coordination:    -       Impaired  rapid alt toe tap and RLE heel shin    Gross Motor Coordination:  impaired 2/2 eakness and decreased coordination  Postural Exam:  Patient presented with the following abnormalities:    -       Rounded shoulders  -       Forward head  Sensation:    -       Intact  light/touch B LE's "Feels less on R"  Skin Integrity/Edema:      -       Skin integrity: Visible skin intact  -       Edema: None noted    RLE ROM: WFL  RLE Strength: WFL  LLE ROM: WFL  LLE Strength: WFL    Functional Mobility:  Transfers:     Sit to Stand:  contact guard assistance with no AD  Gait: 2 steps with Min A and pt unable to advance R LE safely.   Gait training with RW and CGA with VC for sequencing, increased step height/length R LE, and walker management/safety approx 75'  Pt ambulates with Difficulty advancing R LE/dragging, unable to maintain  on Right with RW  Balance: Fair+ sit and  Fair+/fair stand    AM-PAC 6 CLICK MOBILITY  Total Score:17       Treatment and Education:   Gait training as above    Patient left sitting edge of bed with all lines intact, call button in reach, and daughter present.    GOALS:   Multidisciplinary Problems       Physical Therapy Goals          Problem: Physical Therapy    Goal Priority Disciplines Outcome Interventions   Physical Therapy Goal     PT, PT/OT Not Progressing    Description: Goals to be met by: 10/05/2024     Patient will increase functional independence with mobility by performin. Supine to sit with Modified [place]  2. Sit to supine with Modified [place]  3. Sit to stand transfer with Modified [place]  4. Gait  x 100 feet with Modified [place] using LRAD HW vs cane   5. Lower extremity exercise program x30 reps per handout, with independence                         History:     History reviewed. No pertinent past medical history.    History reviewed. No pertinent surgical history.    Time " Tracking:     PT Received On: 09/30/24  PT Start Time: 1027     PT Stop Time: 1050  PT Total Time (min): 23 min     Billable Minutes: Re-eval 15 and Gait Training 8      09/30/2024

## 2024-09-30 NOTE — PLAN OF CARE
Problem: Physical Therapy  Goal: Physical Therapy Goal  Description: Goals to be met by: 10/05/2024     Patient will increase functional independence with mobility by performin. Supine to sit with Modified [place]  2. Sit to supine with Modified [place]  3. Sit to stand transfer with Modified [place]  4. Gait  x 100 feet with Modified [place] using LRAD HW vs cane   5. Lower extremity exercise program x30 reps per handout, with independence    Outcome: Not Progressing   Pt presenting with gait instability this AM.  Will continue to assess gait for LRAD for ambulation HW vs cane.  Low Intensity therapy (community based) recommended.

## 2024-09-30 NOTE — ASSESSMENT & PLAN NOTE
Start on nifedipine 90 mg daily, losartan 100 mg daily, hydralazine 100 mg every 8 hours, labetalol 200 mg every 12, hours, HCTZ 25 mg daily   Monitor BP   Clonidine 0.2 mg every 4 hours as needed  renal artery stenosis US unremarkable

## 2024-09-30 NOTE — HOSPITAL COURSE
Admitted for CVA    In the ED, /105, NSR on EKG. Patient was not noted to have any neurological deficits and was out of window for acute therapy. CT head obtained was unremarkable. Patient was transferred to  inpatient for further evaluation. Upon my evaluation this morning, patient noted to have a mild right-sided facial droop, right-sided arm drift as well as ataxia and slurred speech. NIHSS 4.     MRI brain: Small bandlike region of acute infarction left corona radiata extending to the posterior external capsule. No significant mass effect or hemorrhagic conversion. Superimposed remote right subinsular lacunar type infarcts and patchy and confluent T2 FLAIR signal hyperintensity elsewhere supratentorial white matter while nonspecific concerning for underlying chronic ischemic change  CTA: No FRANCOIS etiology   Echo f/u negative for any immediate high risk cardio embolic etiologies / needing considerations for AC    renal artery stenosis US unremarkable    Concerns of worsening right sided weakness UE > LE; with associated gait instability. Interval CT head - evolving left basal ganglia infarct / wo HT or new infarcts. Otherwise no new focal motor or sensory or cranial nerve deficits.     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   - DAPT X 21 day, ASA 81/Plavix 75, with asa thereafter  - target LDL < 70 / Continue atorvastatin  - euglycemic goals   - permissive HTN x 24 hrs post index event / followed by gradual reduction towards long term < 140/90   - PT/OT recs - discharge planning   - F/u PCP for risk factor modification monitoring  -  on DASH diet; exercise and lifestyle modifications when appropriate   - Provide stroke counseling during the visit - Identification of signs; emergency action and ER attention; Risk factor control, optimization for secondary stroke prevention; Compliance to medications   - F/u vascular neurology     Hypertensive urgency   Patient started on BP  meds: hydralazine, labetalol, Losartan, and nifedipine    Lipoma of skin and subcutaneous tissue of neck  Referral general surgeon on discharge         Inverted papilloma of nasal cavity  MRI brain showed Heterogeneous signal material fills the right maxillary antra extending to the nasal cavity raising concern for possible inverted papilloma versus polyp  clinical correlation and follow-up nonemergent ENT evaluation recommended.      Thyroid lesion  Incidental finding on CT  Follow up outpatient with US  Follow up with PCP      Elevated TSH  TSH 4.907 elevated, T4 wnl  Will repeat TSH in 6 weeks outpatient with PCP  Hold off on meds for now     PT/OT: re-eval and SNF was offered patient refused. Send home with wheelchair, bedside commode, and bath bench.

## 2024-09-30 NOTE — PROGRESS NOTES
AVS virtually reviewed with patient and daughter in its entirety with emphasis on diet, medications, follow-up appointments and reasons to return to the ED or contact the [facility] On Call Nurse Care Line. Patient also encouraged to utilize their patient portal. Ease and convenience of use reiterated. Education complete and patient voiced understanding. All questions answered. Discharge teaching complete.

## 2024-09-30 NOTE — NURSING
Dr. Heart to bedside to assess patient. Patient to CT. KAYLEE Dotson NP spoke with patient's daughter, Isabel. New Orders noted.

## 2024-09-30 NOTE — PT/OT/SLP EVAL
Speech Language Pathology Evaluation  Cognitive/Bedside Swallow    Patient Name:  Radames Tarango   MRN:  [id number]  Admitting Diagnosis: Thrombotic stroke involving left middle cerebral artery    Recommendations:                  General Recommendations:  Dysphagia therapy  Diet recommendations:  Regular, Thin   Aspiration Precautions: 1 bite/sip at a time   General Precautions: Standard,    Communication strategies:  none    Assessment:     Radames Tarango is a 64 y.o. male with dx of CVA he presents with trace dysarthria, trace-mild right sided weakness with inconsistent drooling. Trace-mild immediate recall deficits which patient reports as his baseline.   History:     History reviewed. No pertinent past medical history.    History reviewed. No pertinent surgical history.    Social History: Patient (I) for ADLs    Chest X-Rays: 9/26 No acute cardiopulmonary process     Prior diet: unrestricted    Occupation/hobbies/homemaking: retired     Subjective   Pt cheerful, readily complaint with ST tasks reporting occasional right sided drooling is irritating. Errors during immediate recall tasks patient reports as his baseline.   Patient goals: resolve of occasional drooling    Pain/Comfort:  Pain Rating 1: 0/10    Respiratory Status: Room air    Objective:     Cognitive Status:    WFL  Trace-mild immediate recall deficits which patient reports are his baseline from childhood     Receptive Language:   Comprehension:   [facility]    Pragmatics:    [facility]    Expressive Language:  Verbal:    WFL      Motor Speech:  Trace-mild slur below baseline    Voice:   [facility]    Oral Musculature Evaluation  Oral Musculature: right weakness  Dentition: teeth in poor condition  Secretion Management: right corner drooling  Mucosal Quality: good  Mandibular Strength and Mobility: WFL  Oral Labial Strength and Mobility: [facility]  Lingual Strength and Mobility: [facility]  Voice Prior to PO Intake: [facility]  Oral Musculature Comments: grossly functional with the  excepting of trace-mild right inconsistent drooling during speech    Bedside Swallow Eval:   Consistencies Assessed:  Thin liquids ~4oz via self presented bolttle  Soft solids      Oral Phase:   WFL    Pharyngeal Phase:   no overt clinical signs/symptoms of aspiration    Compensatory Strategies  None    Treatment: please note silent aspiration cannot be r/o at bedside.     Goals:   Multidisciplinary Problems       SLP Goals          Problem: SLP    Goal Priority Disciplines Outcome   SLP Goal    Low SLP Progressing   Description: Pt will (I) perform OMES x20  Pt will (I) perform speech intelligibility strategies at level of conversation.                        Plan:     Patient to be seen:  2 x/week   Plan of Care expires:  10/04/24  Plan of Care reviewed with:  patient   SLP Follow-Up:  Yes       Discharge recommendations:  Therapy Intensity Recommendations at Discharge: Low Intensity Therapy   Barriers to Discharge:  None    Time Tracking:     SLP Treatment Date:   09/30/24  Speech Start Time:  1320  Speech Stop Time:  1347     Speech Total Time (min):  27 min    Billable Minutes: Eval 17  and Eval Swallow and Oral Function 10    09/30/2024

## 2024-09-30 NOTE — PLAN OF CARE
ST RECS: regular with thin liquids, low intensity ST post d/c for slight facial droop with inconsistent drooling and slur below baseline. Naty Cartagena, CCC-SLP

## 2024-09-30 NOTE — PLAN OF CARE
Problem: Adult Inpatient Plan of Care  Goal: Plan of Care Review  Outcome: Adequate for Care Transition  Goal: Patient-Specific Goal (Individualized)  Outcome: Adequate for Care Transition  Goal: Absence of Hospital-Acquired Illness or Injury  Outcome: Adequate for Care Transition  Goal: Optimal Comfort and Wellbeing  Outcome: Adequate for Care Transition  Goal: Readiness for Transition of Care  Outcome: Adequate for Care Transition     Problem: Skin Injury Risk Increased  Goal: Skin Health and Integrity  Outcome: Adequate for Care Transition     Problem: Stroke, Ischemic (Includes Transient Ischemic Attack)  Goal: Effective Bowel Elimination  Outcome: Adequate for Care Transition  Goal: Optimal Cerebral Tissue Perfusion  Outcome: Adequate for Care Transition  Goal: Optimal Cognitive Function  Outcome: Adequate for Care Transition  Goal: Improved Communication Skills  Outcome: Adequate for Care Transition  Goal: Optimal Functional Ability  Outcome: Adequate for Care Transition  Goal: Optimal Nutrition Intake  Outcome: Adequate for Care Transition  Goal: Effective Oxygenation and Ventilation  Outcome: Adequate for Care Transition  Goal: Improved Sensorimotor Function  Outcome: Adequate for Care Transition  Goal: Safe and Effective Swallow  Outcome: Adequate for Care Transition  Goal: Effective Urinary Elimination  Outcome: Adequate for Care Transition

## 2024-09-30 NOTE — ASSESSMENT & PLAN NOTE
Consulted neuro:  CTA head/neck Impression:CTA head: Unremarkable CTA of the head specifically without evidence for proximal significant stenosis or occlusion.CTA neck: No significant arterial stenosis throughout the neck..2.5 cm hypodense lesion left lobe of the thyroid follow-up nonemergent thyroid ultrasound advised.  CT head: No acute intracranial hemorrhage or sulcal effacement to suggest large territory recent infarction.  Patchy hypoattenuation supratentorial white matter while nonspecific suggestive for chronic ischemic change.  Subcentimeter hypodensity right insula suggestive for possible lacunar type infarct overall age indeterminate and may be remote.  MRI brain: Small bandlike region of acute infarction left corona radiata extending to the posterior external capsule.  No significant mass effect or hemorrhagic conversionSuperimposed remote right subinsular lacunar type infarcts and patchy and confluent T2 FLAIR signal hyperintensity elsewhere supratentorial white matter while nonspecific concerning for underlying chronic ischemic change      Plan:     -echo:    Left Ventricle: The left ventricle is normal in size. There is mild concentric hypertrophy. There is normal systolic function with a visually estimated ejection fraction of 60 - 65%. Grade I diastolic dysfunction.    Right Ventricle: Normal right ventricular cavity size. Systolic function is normal.    Left Atrium: Left atrium is mildly dilated.    Pulmonary Artery: The estimated pulmonary artery systolic pressure is 25 mmHg.    IVC/SVC: Normal venous pressure at 3 mmHg.     - after loading with ASA 325mg and Plavix 300mg. Daily aspirin 81 mg /  clopidogrel 75 mg x 21 days followed by ASA 81mg OD monotherapy therafter  - started on Lipitor 40 mg daily  -Holter monitor at d/c if TTE is negative   -Permissive HTN x 24 hrs post index event / long term < 140/90   -Goal Parameters for TIA/stroke: LDL<70 mg/dl, HbA1C: <7.0 %,  SBP<130/80 (discussed risk  factor optimization in order to reduce the risk of future events with help of PCP)  -PT/OT evaluation and therapy goals per their recommendations  -Diet and Exercise: Discussed aggressive lifestyle modification. Eat primarily plant-based foods, such as fruits and vegetables, whole grains, legumes (beans) and nuts. Discussed Mediterranean diet. Daily exercise (150 minutes per week).  -Provided education regarding future stroke identification: I went over the usual stroke warning signs and symptoms, and the need to activate EMS (call 911) as soon as the symptoms present including-sudden onset numbness or weakness of the face, arm, or leg; especially on one side of the body; sudden confusion, trouble speaking, or understanding; sudden trouble seeing in one or both eyes; sudden trouble walking; dizziness; loss of coordination.  - follow up in Neurology Clinic as outpatient    Re-consulted:  Exam: awake, alert, following commands, no obvious aphasia or neglect or visual field deficits, RUE drift and distal weakness / no focal drift in RLE / right lower facial droop and slurred speech   Concerns of worsening right sided weakness UE > LE; with associated gait instability. Interval CT head - evolving left basal ganglia infarct / wo HT or new infarcts. Otherwise no new focal motor or sensory or cranial nerve deficits.      Recs:  Thrombotic stroke involving left MCA vascular territory      - DAPT X 21 day, ASA 81/Plavix 75, with asa thereafter  - target LDL < 70 / Continue atorvastatin  - euglycemic goals   - permissive HTN x 72 hrs post index event / followed by gradual reduction towards long term < 140/90      - PT/OT recs - discharge planning   - F/u PCP for risk factor modification monitoring  -  on DASH diet; exercise and lifestyle modifications when appropriate   - Provide stroke counseling during the visit - Identification of signs; emergency action and ER attention; Risk factor control, optimization for  secondary stroke prevention; Compliance to medications      - F/u vascular neurology

## 2024-09-30 NOTE — DISCHARGE SUMMARY
[facility]  Discharge Summary      Patient Name: Radames Tarango  MRN: [id number]  FERN: [id number]  Patient Class: IP- Inpatient  Admission Date: 9/26/2024  Hospital Length of Stay: 3 days  Discharge Date and Time:  09/30/2024 3:54 PM  Attending Physician: Donald Live, *   Discharging Provider: Juan Dotson NP  Primary Care Provider: Miles Mcclelland Jr., NP    Primary Care Team:  [facility]    HPI:   This is a 64-year-old male with no significant past medical history who presents with dizziness.      Patient presents for acute on chronic dizziness that started 4 months prior to presentation and worsened on the day of presentation.  Associated symptoms include bilateral lower extremity weakness, slurred speech and bilateral numbness.    In the ED, patient was hypertensive (170-210s/70-100s).  Labs were unremarkable.  CT head showed no acute abnormality.  Patient was given aspirin 325 mg, clonidine 0.2 mg p.o., hydralazine 10 mg IV, amlodipine 10 mg p.o., and meclizine 25 mg p.o..  He was transferred from [facility] ED to [facility] for upgrade of care.    On examination: patient daughter and patient reports: slurred speech, right hand writing off, headaches, leg weakness. His has a mild right facial droop, right sided arm drift  as well as ataxia, slurred speech currently. NIH scale of 4.  Patient being worked up for stroke.                 * No surgery found *      Hospital Course:   Admitted for CVA    In the ED, /105, NSR on EKG. Patient was not noted to have any neurological deficits and was out of window for acute therapy. CT head obtained was unremarkable. Patient was transferred to  inpatient for further evaluation. Upon my evaluation this morning, patient noted to have a mild right-sided facial droop, right-sided arm drift as well as ataxia and slurred speech. NIHSS 4.     MRI brain: Small bandlike region of acute infarction left corona radiata  extending to the posterior external capsule. No significant mass effect or hemorrhagic conversion. Superimposed remote right subinsular lacunar type infarcts and patchy and confluent T2 FLAIR signal hyperintensity elsewhere supratentorial white matter while nonspecific concerning for underlying chronic ischemic change  CTA: No FRANCOIS etiology   Echo f/u negative for any immediate high risk cardio embolic etiologies / needing considerations for AC      Concerns of worsening right sided weakness UE > LE; with associated gait instability. Interval CT head - evolving left basal ganglia infarct / wo HT or new infarcts. Otherwise no new focal motor or sensory or cranial nerve deficits.     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   - DAPT X 21 day, ASA 81/Plavix 75, with asa thereafter  - target LDL < 70 / Continue atorvastatin  - euglycemic goals   - permissive HTN x 24 hrs post index event / followed by gradual reduction towards long term < 140/90   - PT/OT recs - discharge planning   - F/u PCP for risk factor modification monitoring  -  on DASH diet; exercise and lifestyle modifications when appropriate   - Provide stroke counseling during the visit - Identification of signs; emergency action and ER attention; Risk factor control, optimization for secondary stroke prevention; Compliance to medications   - F/u vascular neurology     Hypertensive urgency   Patient started on BP meds: hydralazine, labetalol, Losartan, and nifedipine    Lipoma of skin and subcutaneous tissue of neck  Referral general surgeon on discharge         Inverted papilloma of nasal cavity  MRI brain showed Heterogeneous signal material fills the right maxillary antra extending to the nasal cavity raising concern for possible inverted papilloma versus polyp  clinical correlation and follow-up nonemergent ENT evaluation recommended.      Thyroid lesion  Incidental finding on CT  Follow up outpatient with US  Follow up with  PCP      Elevated TSH  TSH 4.907 elevated, T4 wnl  Will repeat TSH in 6 weeks outpatient with PCP  Hold off on meds for now     PT/OT: re-eval and SNF was offered patient refused. Send home with wheelchair, bedside commode, and bath bench.      The mobility limitation cannot be sufficiently resolved by the use of a cane. Patient's functional mobility deficit can be sufficiently resolved with the use of a wheelchair. Patient's mobility limitation significantly impairs their ability to participate in one of more activities of daily living. The use of a wheelchair will significantly improve the patient's ability to participate in MRADLS and the patient will use it on regular basis in the home.     Goals of Care Treatment Preferences:  Code Status: Full Code      SDOH Screening:  The patient was screened for utility difficulties, food insecurity, transport difficulties, housing insecurity, and interpersonal safety and there were no concerns identified this admission.     Consults:   Consults (From admission, onward)          Status Ordering Provider     Inpatient consult to Neurology  Once        Provider:  Hussein Ferrer MD    Completed RHONA DILL     IP consult to case management/social work  Once        Provider:  (Not yet assigned)    Completed RHONA DILL     Inpatient consult to Neurology  Once        Provider:  Jamal Gaitan MD    Completed RHONA DILL              Final Active Diagnoses:    Diagnosis Date Noted POA    PRINCIPAL PROBLEM:  Thrombotic stroke involving left middle cerebral artery [I63.312] 09/27/2024 Yes    Inverted papilloma of nasal cavity [D14.0] 09/28/2024 Unknown    Lipoma of skin and subcutaneous tissue of neck [D17.0] 09/28/2024 Unknown    Elevated TSH [R79.89] 09/27/2024 Unknown    Thyroid lesion [E07.9] 09/27/2024 Unknown    Hypertensive urgency [I16.0] 09/27/2024 Unknown      Problems Resolved During this Admission:       Discharged Condition:  "stable    [place] [facility] [place]:   Follow-up Information       [facility] - Follow up.    Specialties: Home Health Services, Physical Therapy, Occupational Therapy  Why: Office will contact pt.  Contact information:  [address]  [address]  [place] [address]  [phone]               [place]op, Miles L. Jr., NP. Go on 10/7/2024.    Specialty: Internal Medicine  Why: at 1:00PM  Contact information:  [address] [facility]  [facility]  [place] [address]  [phone]                           Patient Instructions:      WHEELCHAIR FOR HOME USE     Order Specific Question Answer Comments   Hours in W/C per day: 24    Type of Wheelchair: Standard    Size(Width): 18"(STD adult)    Leg Support: STD footrests    Leg Support: Elevating leg rests    Leg Support: Swing Away    Leg Support: Articulating leg rests    Arm Height: Detachable    Arm Height: Full length    Arm Height: Desk length    Arm Height: Swing away    Arm Height: Adjust height    Lap Belt: Buckle    Accessories: Anti-tippers    Cushion: Basic    Reclining Back No    Height: 5' 7" (1.702 m)    Weight: 92.9 kg (204 lb 12.9 oz)    Does patient have medical equipment at home? none    Length of need (1-99 months): 99    Please check all that apply: Caregiver is capable and willing to operate wheelchair safely.    Please check all that apply: Patient's upper body strength is sufficient for propulsion.    Please check all that apply: The patient has a cast, brace or muscloskeletal condition which prevents 90 degree flexion of the knee.    Please check all that apply: The patient has significant edema of the lower extremities that requires an elevating leg rest.    Please check all that apply: The patient requires the use of a w/c for activities of daily living within the Home.    Please check all that apply: Patient mobility limitations cannot be sufficiently resolved by the use of other ambulatory therapies.  " "    COMMODE FOR HOME USE     Order Specific Question Answer Comments   Type: Standard    Height: 5' 7" (1.702 m)    Weight: 92.9 kg (204 lb 12.9 oz)    Does patient have medical equipment at home? none    Length of need (1-99 months): 99      BATH/SHOWER CHAIR FOR HOME USE     Order Specific Question Answer Comments   Height: 5' 7" (1.702 m)    Weight: 92.9 kg (204 lb 12.9 oz)    Does patient have medical equipment at home? none    Length of need (1-99 months): 99    Type: With back      US Thyroid   Standing Status: Future Standing Exp. Date: 09/28/25     Order Specific Question Answer Comments   May the Radiologist modify the order per protocol to meet the clinical needs of the patient? Yes    Release to patient Immediate      Ambulatory referral/consult to ENT   Standing Status: Future   Referral Priority: Routine Referral Type: Consultation   Referral Reason: Specialty Services Required   Requested Specialty: Otolaryngology   Number of Visits Requested: 1     Ambulatory referral/consult to General Surgery   Standing Status: Future   Referral Priority: Routine Referral Type: Consultation   Referral Reason: Specialty Services Required   Requested Specialty: General Surgery   Number of Visits Requested: 1     Ambulatory referral/consult to Cardiology   Standing Status: Future   Referral Priority: Routine Referral Type: Consultation   Referral Reason: Specialty Services Required   Requested Specialty: Cardiology   Number of Visits Requested: 1     Ambulatory referral/consult to Vascular Neurology   Standing Status: Future   Referral Priority: Routine Referral Type: Consultation   Referral Reason: Specialty Services Required   Requested Specialty: Vascular Neurology   Number of Visits Requested: 1     Ambulatory referral/consult to Physical/Occupational Therapy   Standing Status: Future   Referral Priority: Routine Referral Type: Physical Medicine   Referral Reason: Specialty Services Required   Number of Visits " "Requested: 1     Holter monitor - 48 hour   Standing Status: Future Standing Exp. Date: 09/29/25     Order Specific Question Answer Comments   Release to patient Immediate        Significant Diagnostic Studies: Labs: BMP:   Recent Labs   Lab 09/29/24  0402   GLU 96      K 3.9      CO2 22*   BUN 20   CREATININE 1.3   CALCIUM 8.6*   , CMP   Recent Labs   Lab 09/29/24  0402      K 3.9      CO2 22*   GLU 96   BUN 20   CREATININE 1.3   CALCIUM 8.6*   PROT 6.4   ALBUMIN 3.7   BILITOT 0.9   ALKPHOS 60   AST 21   ALT 13   ANIONGAP 10   , CBC   Recent Labs   Lab 09/29/24  0402   WBC 5.32   HGB 13.4*   HCT 41.4      , INR   Lab Results   Component Value Date    INR 1.0 09/28/2024    INR 1.0 09/27/2024   , Lipid Panel   Lab Results   Component Value Date    CHOL 143 09/27/2024    HDL 64 09/27/2024    LDLCALC 66.0 09/27/2024    TRIG 65 09/27/2024    CHOLHDL 44.8 09/27/2024   , Troponin No results for input(s): "TROPONINI" in the last 168 hours., A1C:   Recent Labs   Lab 09/27/24  0452   HGBA1C 4.3   , and All labs within the past 24 hours have been reviewed    Pending Diagnostic Studies:       None           Medications:  Reconciled Home Medications:      Medication List        START taking these medications      aspirin 81 MG EC tablet  Commonly known as: ECOTRIN  Take 1 tablet (81 mg total) by mouth once daily.  Start taking on: October 1, 2024     atorvastatin 40 MG tablet  Commonly known as: LIPITOR  Take 1 tablet (40 mg total) by mouth once daily.  Start taking on: October 1, 2024     clopidogreL 75 mg tablet  Commonly known as: PLAVIX  Take 1 tablet (75 mg total) by mouth once daily. for 16 days  Start taking on: October 1, 2024     hydrALAZINE 100 MG tablet  Commonly known as: APRESOLINE  Take 1 tablet (100 mg total) by mouth every 8 (eight) hours.     hydroCHLOROthiazide 25 MG tablet  Commonly known as: HYDRODIURIL  Take 1 tablet (25 mg total) by mouth once daily.  Start taking on: October " 1, 2024     labetaloL 200 MG tablet  Commonly known as: NORMODYNE  Take 1 tablet (200 mg total) by mouth every 12 (twelve) hours.     losartan 100 MG tablet  Commonly known as: COZAAR  Take 1 tablet (100 mg total) by mouth once daily.  Start taking on: October 1, 2024     NIFEdipine 90 MG (OSM) 24 hr tablet  Commonly known as: PROCARDIA-XL  Take 1 tablet (90 mg total) by mouth once daily.  Start taking on: October 1, 2024              Indwelling Lines/Drains at time of discharge:   Lines/Drains/Airways       None                   Time spent on the discharge of patient: 45 minutes         Juan Dotson NP  Department of Hospital Medicine  [facility] - Telemetry

## 2024-09-30 NOTE — PLAN OF CARE
"Case Management Final Discharge Note  Voicemail left for [facility] informing of pt discharge today. Wheelchair delivered to pt's room    Discharge Disposition: Home health ([facility])    New DME ordered / company name: 18" Wheelchair from [facility]    Relevant SDOH / Transition of Care Barriers:  None identified at this time    Primary Caretaker and contact information: Crystal Sotomayor [phone]    Scheduled followup appointment: PCP Dr. Mcclelland 10/7/24 1300    Referrals placed: ENT, General Surgery, cardiovascular, neurology    Transportation: private vehicle via family    Patient and family educated on discharge services and updated on DC plan. Bedside RN notified, patient clear to discharge from Case Management Perspective.      09/30/24 1621   Final Note   Assessment Type Final Discharge Note   Anticipated Discharge Disposition [facility]   Hospital Resources/Appts/Education Provided Provided patient/caregiver with written discharge plan information;Post-Acute resouces added to AVS   Post-Acute Status   Coverage PHN   Discharge Delays None known at this time         "

## 2024-09-30 NOTE — PLAN OF CARE
The mobility limitation cannot be sufficiently resolved by the use of a cane. Patient's functional mobility deficit can be sufficiently resolved with the use of a wheelchair. Patient's mobility limitation significantly impairs their ability to participate in one of more activities of daily living. The use of a wheelchair will significantly improve the patient's ability to participate in MRADLS and the patient will use it on regular basis in the home.

## 2024-09-30 NOTE — PLAN OF CARE
[facility] - Telemetry      HOME HEALTH ORDERS  FACE TO FACE ENCOUNTER    Patient Name: Radames Tarango  YOB: 1960    PCP: Miles Mcclelland Jr., NP   PCP Address: [address] [place] [address] / [place]  PCP Phone Number: [phone]  PCP Fax: [phone]    Encounter Date: 9/26/24    Admit to Home Health    Diagnoses:  Active Hospital Problems    Diagnosis  POA    *Thrombotic stroke involving left middle cerebral artery [I63.312]  Yes    Inverted papilloma of nasal cavity [D14.0]  Unknown    Lipoma of skin and subcutaneous tissue of neck [D17.0]  Unknown    Elevated TSH [R79.89]  Unknown    Thyroid lesion [E07.9]  Unknown    Hypertensive urgency [I16.0]  Unknown      Resolved Hospital Problems   No resolved problems to display.       Follow Up Appointments:  No future appointments.    Allergies:Review of patient's allergies indicates:  No Known Allergies    Medications: Review discharge medications with patient and family and provide education.    Current Facility-Administered Medications   Medication Dose Route Frequency Provider Last Rate Last Admin    acetaminophen tablet 650 mg  650 mg Oral Q6H PRN Nila, Manicia S., NP   650 mg at 09/30/24 0931    albuterol-ipratropium 2.5 mg-0.5 mg/3 mL nebulizer solution 3 mL  3 mL Nebulization Q4H PRN Samuel Heart MD        aluminum-magnesium hydroxide-simethicone 200-200-20 mg/5 mL suspension 30 mL  30 mL Oral QID PRN Samuel Heart MD        aspirin EC tablet 81 mg  81 mg Oral Daily Nila, Manicia S., NP   81 mg at 09/30/24 0920    atorvastatin tablet 40 mg  40 mg Oral Daily Nila, Manicia S., NP   40 mg at 09/30/24 0921    bisacodyL suppository 10 mg  10 mg Rectal Daily PRN Samuel Heart MD        cloNIDine tablet 0.2 mg  0.2 mg Oral Q4H PRN Donald Live MD        clopidogreL tablet 75 mg  75 mg Oral Daily Nila, Manicia S., NP   75 mg at 09/30/24 0920    dextrose 10% bolus 125 mL 125 mL  12.5 g Intravenous PRN Sly  MD Samuel        dextrose 10% bolus 250 mL 250 mL  25 g Intravenous PRN Samuel Heart MD        enoxaparin injection 40 mg  40 mg Subcutaneous Daily Samuel Heart MD   40 mg at 09/29/24 1700    glucagon (human recombinant) injection 1 mg  1 mg Intramuscular PRN Samuel Heart MD        glucose chewable tablet 16 g  16 g Oral PRN Samuel Heart MD        glucose chewable tablet 24 g  24 g Oral PRN Samuel Heart MD        hydrALAZINE tablet 100 mg  100 mg Oral Q8H NilaSteveicia S., NP   100 mg at 09/30/24 1314    hydroCHLOROthiazide tablet 25 mg  25 mg Oral Daily NilaSteveicia S., NP   25 mg at 09/30/24 0921    labetaloL tablet 200 mg  200 mg Oral Q12H NilaViniciusa S., NP   200 mg at 09/30/24 0921    losartan tablet 100 mg  100 mg Oral Daily NilaSteveicia S., NP   100 mg at 09/30/24 0920    magnesium oxide tablet 800 mg  800 mg Oral PRN Samuel Heart MD        magnesium oxide tablet 800 mg  800 mg Oral PRN Samuel Heart MD        melatonin tablet 6 mg  6 mg Oral Nightly PRN Samuel Heart MD   6 mg at 09/29/24 2120    naloxone 0.4 mg/mL injection 0.02 mg  0.02 mg Intravenous PRN Samuel Heart MD        NIFEdipine 24 hr tablet 90 mg  90 mg Oral Daily Nila, Steveicia S., NP   90 mg at 09/30/24 0921    nitroGLYCERIN SL tablet 0.4 mg  0.4 mg Sublingual Q5 Min PRN Samuel Heart MD        ondansetron injection 4 mg  4 mg Intravenous Q8H PRN Samuel Heart MD        polyethylene glycol packet 17 g  17 g Oral Daily Samuel Heart MD   17 g at 09/30/24 0920    potassium bicarbonate disintegrating tablet 35 mEq  35 mEq Oral WESLEYN Samuel Heart MD        potassium bicarbonate disintegrating tablet 50 mEq  50 mEq Oral PRN Samuel Heart MD        potassium bicarbonate disintegrating tablet 60 mEq  60 mEq Oral PRN Samuel Heart MD        potassium, sodium phosphates 280-160-250 mg packet 2 packet  2 packet Oral PRN Samuel Heart MD        potassium, sodium phosphates 280-160-250 mg packet 2 packet  2 packet Oral PRN Sly,  MD Samuel        potassium, sodium phosphates 280-160-250 mg packet 2 packet  2 packet Oral PRN Samuel Heart MD        prochlorperazine injection Soln 5 mg  5 mg Intravenous Q6H PRN Samuel Heart MD        simethicone chewable tablet 80 mg  1 tablet Oral QID PRN Samuel Heart MD        sodium chloride 0.9% flush 10 mL  10 mL Intravenous Q12H PRN Samuel Heart MD            Medication List        START taking these medications      aspirin 81 MG EC tablet  Commonly known as: ECOTRIN  Take 1 tablet (81 mg total) by mouth once daily.  Start taking on: October 1, 2024     atorvastatin 40 MG tablet  Commonly known as: LIPITOR  Take 1 tablet (40 mg total) by mouth once daily.  Start taking on: October 1, 2024     clopidogreL 75 mg tablet  Commonly known as: PLAVIX  Take 1 tablet (75 mg total) by mouth once daily. for 16 days  Start taking on: October 1, 2024     hydrALAZINE 100 MG tablet  Commonly known as: APRESOLINE  Take 1 tablet (100 mg total) by mouth every 8 (eight) hours.     hydroCHLOROthiazide 25 MG tablet  Commonly known as: HYDRODIURIL  Take 1 tablet (25 mg total) by mouth once daily.  Start taking on: October 1, 2024     labetaloL 200 MG tablet  Commonly known as: NORMODYNE  Take 1 tablet (200 mg total) by mouth every 12 (twelve) hours.     losartan 100 MG tablet  Commonly known as: COZAAR  Take 1 tablet (100 mg total) by mouth once daily.  Start taking on: October 1, 2024     NIFEdipine 90 MG (OSM) 24 hr tablet  Commonly known as: PROCARDIA-XL  Take 1 tablet (90 mg total) by mouth once daily.  Start taking on: October 1, 2024                I have seen and examined this patient within the last 30 days. My clinical findings that support the need for the home health skilled services and home bound status are the following:no   Weakness/numbness causing balance and gait disturbance due to Stroke making it taxing to leave home.     Diet:   cardiac diet        Referrals/ Consults  Aide to provide assistance with  personal care, ADLs, and vital signs.    Activities:   activity as tolerated    Nursing:   Agency to admit patient within 24 hours of hospital discharge unless specified on physician order or at patient request    SN to complete comprehensive assessment including routine vital signs. Instruct on disease process and s/s of complications to report to MD. Review/verify medication list sent home with the patient at time of discharge  and instruct patient/caregiver as needed. Frequency may be adjusted depending on start of care date.     Skilled nurse to perform up to 3 visits PRN for symptoms related to diagnosis    Notify MD if SBP > 160 or < 90; DBP > 90 or < 50; HR > 120 or < 50; Temp > 101; O2 < 88%; Other:      Ok to schedule additional visits based on staff availability and patient request on consecutive days within the home health episode.    When multiple disciplines ordered:    Start of Care occurs on Sunday - Wednesday schedule remaining discipline evaluations as ordered on separate consecutive days following the start of care.    Thursday SOC -schedule subsequent evaluations Friday and Monday the following week.     Friday - Saturday SOC - schedule subsequent discipline evaluations on consecutive days starting Monday of the following week.    For all post-discharge communication and subsequent orders please contact patient's primary care physician. If unable to reach primary care physician or do not receive response within 30 minutes, please contact [facility]  for clinical staff order clarification        Home Health Aide:  Nursing Monday, Wednesday and Friday,   Home Health Aide Monday, Wednesday and Friday        I certify that this patient is confined to his home and needs intermittent nursing care.

## 2024-09-30 NOTE — PT/OT/SLP PROGRESS
Occupational Therapy   Treatment    Name: Radames Tarango  MRN: [id number]  Admitting Diagnosis:  Thrombotic stroke involving left middle cerebral artery       Recommendations:     Discharge Recommendations: Low Intensity Therapy (community based)  Discharge Equipment Recommendations:  wheelchair, bedside commode, bath bench  Barriers to discharge:  None    Assessment:     Radames Tarango is a 64 y.o. male with a medical diagnosis of Thrombotic stroke involving left middle cerebral artery.  He presents with sitting EOB with PT and daughter present. Performance deficits affecting function are weakness, impaired endurance, impaired self care skills, impaired functional mobility, impaired sensation, gait instability, impaired balance, decreased upper extremity function, decreased lower extremity function, decreased safety awareness, abnormal tone, decreased ROM, impaired coordination, impaired fine motor, impaired joint extensibility.  Patient reported that he used his left arm to support his right arm when eating or grooming to increase elbow flexion and provide support for FMC. He had decreased  today requiring addition of Coban wrap to foam handles (beige, small diameter and red, medium diameter) to increase size and weight of utensil and toothbrush. Patient described heaviness of right arm compared to muscle tone yesterday and he exhibited pronator drift when holding his right arm compared to the Left arm with less ability to hold the arm against gravity today than yesterday.     Rehab Prognosis:  Good; patient would benefit from acute skilled OT services to address these deficits and reach maximum level of function.       Plan:     Patient to be seen 5 x/week to address the above listed problems via self-care/home management, therapeutic activities, therapeutic exercises, neuromuscular re-education  Plan of Care Expires: 09/12/24  Plan of Care Reviewed with: patient, daughter    Subjective     Chief Complaint:  heaviness of arm, unable to  utensil or toothbrush with hand and just handles  Patient/Family Comments/goals: patient wants to return home   Pain/Comfort:  Pain Rating 1: 0/10    Objective:     Communicated with: RN, Ellie, prior to session.  Patient found sitting edge of bed with telemetry upon OT entry to room.    General Precautions: Standard, fall    Orthopedic Precautions:N/A  Braces: N/A  Respiratory Status: Room air     Occupational Performance:     Bed Mobility:    N/A patient sat EOB throughout session      Functional Mobility/Transfers:  NT    Activities of Daily Living:  Feeding:  added foam handle utensils with coban wrap so that patient could grasp utensil    Grooming: added foam handle utensils with coban wrap so that patient could grasp utensil        [facility] 6 Click ADL: 15    Treatment & Education:  Occupational therapy added foam handle utensils with coban wrap so that patient could grasp utensils with increased ease.   Occupational therapy did an advanced visual screening and did not see the following impairments: blurriness, double vision, nystagmus, reduced visual tracking, inattention or visual field deficits.     Patient left sitting edge of bed with all lines intact, call button in reach, RN notified, and daughter  present    GOALS:   Multidisciplinary Problems       Occupational Therapy Goals          Problem: Occupational Therapy    Goal Priority Disciplines Outcome Interventions   Occupational Therapy Goal     OT, PT/OT Progressing    Description: Goals to be met by: 10/12/24     Patient will increase functional independence with ADLs by performing:    Feeding with [place].  LE Dressing with [place].  Grooming while standing at sink with [place].  Supine to sit with [place].  Step transfer with [place]  Toilet transfer to toilet with [place].  Upper extremity exercise program (thera-putty therex) x 10-15  reps per handout, with independence.                          Time Tracking:     OT Date of Treatment: 09/30/24  OT Start Time: 1049  OT Stop Time: 1145  OT Total Time (min): 56 min    Billable Minutes:Self Care/Home Management 45  Neuromuscular Re-education 11     OT/TREVIN: OT          9/30/2024

## 2024-10-02 DIAGNOSIS — I63.9 CEREBROVASCULAR ACCIDENT (CVA), UNSPECIFIED MECHANISM: Primary | ICD-10-CM

## 2024-10-03 NOTE — NURSING
"Arrived in patients room during rounds. Patient notified me of new feelings on R side of his face. He states it feels "funny" when I asked him to describe it he said...It felt "bigger than normally." Particularly his eye and cheek. At a glance, I did not notice a change in size and patient was able to tell me that I was touching the right side of his face. At that time I noted that he still had a droop on the left side of his face from my morning assessment. His right arm and leg were still weaker than the left, no more than my previous assessment, Still able to lift and squeeze my hand, no new numbness or tingling .    Notified RHODA Dotson at the time of the new symptoms. I also notified [facility] nurse Galina DOWNS, who rounded on the patient.    ICU nurse Galina followed up and told me that when she rounded, the N.P. was already at the bedside and she would check back on them.  "